# Patient Record
Sex: MALE | Race: WHITE | NOT HISPANIC OR LATINO | Employment: UNEMPLOYED | URBAN - METROPOLITAN AREA
[De-identification: names, ages, dates, MRNs, and addresses within clinical notes are randomized per-mention and may not be internally consistent; named-entity substitution may affect disease eponyms.]

---

## 2022-08-31 ENCOUNTER — HOSPITAL ENCOUNTER (EMERGENCY)
Facility: HOSPITAL | Age: 22
Discharge: HOME | End: 2022-08-31
Attending: EMERGENCY MEDICINE
Payer: COMMERCIAL

## 2022-08-31 VITALS
OXYGEN SATURATION: 97 % | BODY MASS INDEX: 24.33 KG/M2 | DIASTOLIC BLOOD PRESSURE: 71 MMHG | WEIGHT: 155 LBS | HEART RATE: 86 BPM | TEMPERATURE: 100 F | RESPIRATION RATE: 18 BRPM | HEIGHT: 67 IN | SYSTOLIC BLOOD PRESSURE: 134 MMHG

## 2022-08-31 DIAGNOSIS — S62.306D CLOSED DISPLACED FRACTURE OF FIFTH METACARPAL BONE OF RIGHT HAND WITH ROUTINE HEALING, UNSPECIFIED PORTION OF METACARPAL, SUBSEQUENT ENCOUNTER: Primary | ICD-10-CM

## 2022-08-31 PROCEDURE — 99284 PR EMERGENCY DEPT VISIT,LEVEL IV: ICD-10-PCS | Mod: ,,, | Performed by: EMERGENCY MEDICINE

## 2022-08-31 PROCEDURE — 99281 EMR DPT VST MAYX REQ PHY/QHP: CPT

## 2022-08-31 PROCEDURE — 99284 EMERGENCY DEPT VISIT MOD MDM: CPT | Mod: ,,, | Performed by: EMERGENCY MEDICINE

## 2022-08-31 NOTE — FIRST PROVIDER EVALUATION
Emergency Department TeleTriage Encounter Note      CHIEF COMPLAINT    Chief Complaint   Patient presents with    Arm Injury     Broken r hand in [place] on sat, here to see ortho       VITAL SIGNS   Initial Vitals [08/31/22 1742]   BP Pulse Resp Temp SpO2   134/71 86 18 99.7 °F (37.6 °C) 97 %      MAP       --            ALLERGIES    Review of patient's allergies indicates:  Not on File    PROVIDER TRIAGE NOTE  This is a teletriage evaluation of a 22 y.o. male presenting to the ED complaining of boxer fracture diagnosed on Saturday in [place] - came out our ED for a cast.  Explained to patient that this is typically outpatient care - but will defer to onsite provider if ortho can see patient in ED.    Initial orders will be placed and care will be transferred to an alternate provider when patient is roomed for a full evaluation. Any additional orders and the final disposition will be determined by that provider.         ORDERS  Labs Reviewed - No data to display    ED Orders (720h ago, onward)      None              Virtual Visit Note: The provider triage portion of this emergency department evaluation and documentation was performed via [employer], a HIPAA-compliant telemedicine application, in concert with a tele-presenter in the room. A face to face patient evaluation with one of my colleagues will occur once the patient is placed in an emergency department room.      DISCLAIMER: This note was prepared with [employer] voice recognition transcription software. Garbled syntax, mangled pronouns, and other bizarre constructions may be attributed to that software system.

## 2022-08-31 NOTE — ED TRIAGE NOTES
22 y.o. male arrived to the ED via personal transport from home for c/o of arm injury. Pt dx'd w/ boxer's fracture of right hand on Friday, 8/26/22, and splinted by physician at home in [place]. Pt reports traveling down to [facility] for work on Saturday and instructed to come to ER for ortho consult. Denies numbness, tingling, SOB, or chest pain.

## 2022-09-01 NOTE — DISCHARGE INSTRUCTIONS
Please follow-up with Orthopedics for casting of your hand fracture.  Wear the provided splint do not get it wet until you are seen by them.  Take Tylenol or ibuprofen as indicated to help relieve pain.

## 2022-09-01 NOTE — ED PROVIDER NOTES
Encounter Date: 8/31/2022       History     Chief Complaint   Patient presents with    Arm Injury     Broken r hand in [place] on sat, here to see ortho     22M with [facility] remote hand fx, presenting for f/u casting of 5th R MC fx sustained 5 days ago. Pt reports he was striking a punching bag when he injured his hand, initially seen at  in [place] where XR showing displaced 5th MC fx and hand was splinted. Pt presents today because he says he was referred to [facility] for casting. Denies damage to splint, increasing pain, numbness or weakness.     Review of patient's allergies indicates:  No Known Allergies  No past medical history on file.  No past surgical history on file.  No family history on file.     Review of Systems   Musculoskeletal:  Positive for arthralgias (R hand pain).   Skin:  Negative for color change and pallor.   Allergic/Immunologic: Negative for immunocompromised state.   Neurological:  Negative for weakness and numbness.   Hematological:  Does not bruise/bleed easily.     Physical Exam     Initial Vitals [08/31/22 1742]   BP Pulse Resp Temp SpO2   134/71 86 18 99.7 °F (37.6 °C) 97 %      MAP       --         Physical Exam    Nursing note and vitals reviewed.  Constitutional: He appears well-developed and well-nourished. He is not diaphoretic. No distress.   HENT:   Head: Normocephalic and atraumatic.   Nose: Nose normal.   Eyes: Conjunctivae and EOM are normal. Pupils are equal, round, and reactive to light.   Neck:   Normal range of motion.  Cardiovascular:  Normal rate and regular rhythm.           Musculoskeletal:         General: Normal range of motion.      Cervical back: Normal range of motion.      Comments: R hand splinted, CSM intact distally      Neurological: He is alert and oriented to person, place, and time. He has normal strength.   Skin: Skin is warm and dry. Capillary refill takes less than 2 seconds. No pallor.   Psychiatric: He has a normal mood and affect. His behavior is  "normal. Judgment and thought content normal.       ED Course   Procedures  Labs Reviewed - No data to display       Imaging Results    None          Medications - No data to display  Medical Decision Making:   History:   Old Medical Records: I decided to obtain old medical records.  Old Records Summarized: records from clinic visits, records from previous admission(s) and records from another hospital.       <> Summary of Records: From  8/27: "Right hand x-ray: Mildly angulated 5th metacarpal head fracture/boxer's fracture  Patient placed in a ulnar gutter splint and referral sent to Orthopedics. Patient well appearing. Verbal instructions given"    Initial Assessment:   R hand splinted in appropriate position, pt denies worsening pain, CSM intact. No indication for rpt imaging.     Differential Diagnosis:   Hand fracture  ED Management:  D/w Ortho who state that they accept pt's insurance, arranged for outpt f/u. Stable for d/c, I discussed outpatient follow up and return precautions with pt and answered all questions.    Other:   I have discussed this case with another health care provider.                  Clinical Impression:   Final diagnoses:  [S62.306R] Closed displaced fracture of fifth metacarpal bone of right hand with routine healing, unspecified portion of metacarpal, subsequent encounter (Primary)        ED Disposition Condition    Discharge Stable          ED Prescriptions    None       Follow-up Information       Follow up With Specialties Details Why Contact Info Additional Information    [facility] - Emergency Dept Emergency Medicine Go to  If symptoms worsen [address]  [facility] [address] [phone]     [facility] - Orthopedics [facility] Schedule an appointment as soon as possible for a visit   [address], [address]  [facility] [address] [phone] [facility] - Main Building, 5th Floor Please park in [facility] and take " Atrium elevator             Deyanira Pinon MD  09/18/22 5061

## 2022-09-07 ENCOUNTER — TELEPHONE (OUTPATIENT)
Dept: ORTHOPEDICS | Facility: CLINIC | Age: 22
End: 2022-09-07
Payer: COMMERCIAL

## 2022-09-07 DIAGNOSIS — M79.641 RIGHT HAND PAIN: Primary | ICD-10-CM

## 2022-09-07 NOTE — TELEPHONE ENCOUNTER
----- Message from Filomena Schwab MA sent at 9/6/2022  3:10 PM CDT -----    ----- Message -----  From: Rebecca Rosado  Sent: 9/6/2022   2:33 PM CDT  To: [facility] Clinical Support    Type :  Needs Medical Advice    Who Called: pt  Symptoms (please be specific): Closed displaced fracture  How long has patient had these symptoms:  Closed displaced fracture  Pharmacy name and phone #:  no  Would the patient rather a call back or a response via [facility]?  Call   Best Call Back Number:  [phone]

## 2022-09-07 NOTE — TELEPHONE ENCOUNTER
Spoke with pt.   Pt was seen in the ED on 8/31 and was told that he needed to follow up with Orthopedics to have a cast placed.  Pt is calling for an appointment as he was never called to be scheduled.   Apologized to pt.   Scheduled pt to be seen in clinic tomorrow for ED follow up.  Pt verbalized understanding.  Pt given directions to [place]    Pt reports he has been in a splint since he was x-rayed and splinted in [place] prior to arriving in [facility]

## 2022-09-08 ENCOUNTER — HOSPITAL ENCOUNTER (OUTPATIENT)
Dept: RADIOLOGY | Facility: HOSPITAL | Age: 22
Discharge: HOME | End: 2022-09-08
Attending: PHYSICIAN ASSISTANT
Payer: COMMERCIAL

## 2022-09-08 ENCOUNTER — OFFICE VISIT (OUTPATIENT)
Dept: ORTHOPEDICS | Facility: CLINIC | Age: 22
End: 2022-09-08
Payer: COMMERCIAL

## 2022-09-08 DIAGNOSIS — M79.641 RIGHT HAND PAIN: ICD-10-CM

## 2022-09-08 DIAGNOSIS — S62.339A CLOSED BOXER'S FRACTURE, INITIAL ENCOUNTER: Primary | ICD-10-CM

## 2022-09-08 PROCEDURE — 99203 OFFICE O/P NEW LOW 30 MIN: CPT | Mod: S$GLB,,, | Performed by: PHYSICIAN ASSISTANT

## 2022-09-08 PROCEDURE — 99999 PR PBB SHADOW E&M-EST. PATIENT-LVL II: CPT | Mod: PBBFAC,,, | Performed by: PHYSICIAN ASSISTANT

## 2022-09-08 PROCEDURE — 73130 X-RAY EXAM OF HAND: CPT | Mod: 26,RT,, | Performed by: RADIOLOGY

## 2022-09-08 PROCEDURE — 99203 PR OFFICE/OUTPT VISIT, NEW, LEVL III, 30-44 MIN: ICD-10-PCS | Mod: S$GLB,,, | Performed by: PHYSICIAN ASSISTANT

## 2022-09-08 PROCEDURE — 99999 PR PBB SHADOW E&M-EST. PATIENT-LVL II: ICD-10-PCS | Mod: PBBFAC,,, | Performed by: PHYSICIAN ASSISTANT

## 2022-09-08 PROCEDURE — 73130 X-RAY EXAM OF HAND: CPT | Mod: TC,PN,RT

## 2022-09-08 PROCEDURE — 73130 XR HAND COMPLETE 3 VIEW RIGHT: ICD-10-PCS | Mod: 26,RT,, | Performed by: RADIOLOGY

## 2022-09-08 RX ORDER — MELOXICAM 15 MG/1
15 TABLET ORAL DAILY
Qty: 30 TABLET | Refills: 0 | Status: SHIPPED | OUTPATIENT
Start: 2022-09-08 | End: 2023-11-13

## 2022-09-08 NOTE — PROGRESS NOTES
[facility]  History & Physical  Orthopedics    SUBJECTIVE:      Chief Complaint: Right hand injury    Referring Provider: Les Ferrer Dr. is the supervising physician for this encounter/patient    History of Present Illness:  Patient is a 22 y.o. right hand dominant male who presents today with complaints of right hand boxers fracture. Injury occurred on 8/26/22 while doing a boxing workout at the gym back [facility]. This happened just prior to his leaving [place] to visit his father in [facility]. He was treated at the time with a splint and encouraged to see ortho here for cast. He went to the [facility] ED last week for a cast, but no treatment was performed. He reports 3/10 pain, mostly in the ring finger. Stiffness of the fingers due to immobilization. He takes Ibuprofen rarely.    Onset of symptoms/DOI was 8/26/22.    Symptoms are aggravated by activity and movement.    Symptoms are alleviated by rest and immobilization.    Symptoms consist of pain and decreased ROM.    The patient rates their pain as a 3/10.    Attempted treatment(s) and/or interventions include activity modifications, rest, anti-inflammatory medications and immobilization.     The patient denies any fevers, chills, N/V, D/C and presents for evaluation.       No past medical history on file.  No past surgical history on file.  Review of patient's allergies indicates:  No Known Allergies  Social History     Social History Narrative    Not on file     No family history on file.      Current Outpatient Medications:     meloxicam (MOBIC) 15 MG tablet, Take 1 tablet (15 mg total) by mouth once daily., Disp: 30 tablet, Rfl: 0      Review of Systems:  Constitutional: no fever or chills  Eyes: no visual changes  ENT: no nasal congestion or sore throat  Respiratory: no cough or shortness of breath  Cardiovascular: no chest pain  Gastrointestinal: no nausea or vomiting, tolerating diet  Musculoskeletal: pain, soreness,  and decreased ROM    OBJECTIVE:      Vital Signs (Most Recent):  There were no vitals filed for this visit.  There is no height or weight on file to calculate BMI.      Physical Exam:  Constitutional: The patient appears well-developed and well-nourished. No distress.   Skin: No lesions appreciated  Head: Normocephalic and atraumatic.   Nose: Nose normal.   Ears: No deformities seen  Eyes: Conjunctivae and EOM are normal.   Neck: No tracheal deviation present.   Cardiovascular: Normal rate and intact distal pulses.    Pulmonary/Chest: Effort normal. No respiratory distress.   Abdominal: There is no guarding.   Neurological: The patient is alert.   Psychiatric: The patient has a normal mood and affect.     Right Hand/Wrist Examination:    Observation/Inspection:  Swelling  none    Deformity  none  Discoloration  Healing ecchymosis present    Scars   none    Atrophy  none    HAND/WRIST EXAMINATION:  Finkelstein's Test   Neg  WHAT Test    Neg  Snuff box tenderness   Neg  Saul's Test    Neg  Hook of Hamate Tenderness  Neg  CMC grind    Neg  Circumduction test   Neg  Very mild TTP to the 5th metacarpal head. TTP to the ring PIP volar surface    Neurovascular Exam:  Digits WWP, brisk CR < 3s throughout  NVI motor/LTS to M/R/U nerves, radial pulse 2+  Tinel's Test - Carpal Tunnel  Neg  Tinel's Test - Cubital Tunnel  Neg  Phalen's Test    Neg  Median Nerve Compression Test Neg    ROM hand: decreased due to pain/stiffness, no rotational deformity noted    ROM wrist full, painless    ROM elbow full, painless    Abdomen not guarded  Respirations nonlabored  Perfusion intact    Diagnostic Results:     Imaging - I independently viewed the patient's imaging as well as the radiology report.      FINDINGS:  There is irregularity of the distal 5th metacarpal suggesting a fracture with mild angulation.  Remainder of the bony structures are intact.     Impression:     Fracture of the distal 5th metacarpal    EMG -  none    ASSESSMENT/PLAN:      22 y.o. yo male with Right 5th metacarpal fracture, angulation within tolerance. Right 4th middle phalanx volar avulsion fracture noted as well. 2 weeks from injury    Plan: The patient and I had a thorough discussion today.  We discussed the working diagnosis as well as several other potential alternative diagnoses.  Xrays reviewed with patient. He is given a TKO brace to wear, may remove for gentle motion, however we discuss NWB. Mobic 15mg ordered today. I will see him back in 3 weeks for repeat hand xrays.    Should the patient's symptoms worsen, persist, or fail to improve they should return for reevaluation and I would be happy to see them back anytime.           Please do not hesitate to reach out to us via email, phone, or MyChart with any questions, concerns, or feedback.

## 2022-09-22 ENCOUNTER — TELEPHONE (OUTPATIENT)
Dept: ORTHOPEDICS | Facility: CLINIC | Age: 22
End: 2022-09-22
Payer: COMMERCIAL

## 2022-09-22 DIAGNOSIS — M79.641 RIGHT HAND PAIN: Primary | ICD-10-CM

## 2022-09-30 ENCOUNTER — HOSPITAL ENCOUNTER (OUTPATIENT)
Dept: RADIOLOGY | Facility: HOSPITAL | Age: 22
Discharge: HOME | End: 2022-09-30
Attending: PHYSICIAN ASSISTANT
Payer: COMMERCIAL

## 2022-09-30 ENCOUNTER — OFFICE VISIT (OUTPATIENT)
Dept: ORTHOPEDICS | Facility: CLINIC | Age: 22
End: 2022-09-30
Payer: COMMERCIAL

## 2022-09-30 DIAGNOSIS — M79.641 RIGHT HAND PAIN: ICD-10-CM

## 2022-09-30 DIAGNOSIS — S62.339A CLOSED BOXER'S FRACTURE, INITIAL ENCOUNTER: Primary | ICD-10-CM

## 2022-09-30 PROCEDURE — 73130 X-RAY EXAM OF HAND: CPT | Mod: 26,RT,, | Performed by: RADIOLOGY

## 2022-09-30 PROCEDURE — 73130 XR HAND COMPLETE 3 VIEW RIGHT: ICD-10-PCS | Mod: 26,RT,, | Performed by: RADIOLOGY

## 2022-09-30 PROCEDURE — 99999 PR PBB SHADOW E&M-EST. PATIENT-LVL II: ICD-10-PCS | Mod: PBBFAC,,, | Performed by: PHYSICIAN ASSISTANT

## 2022-09-30 PROCEDURE — 73130 X-RAY EXAM OF HAND: CPT | Mod: TC,PO,RT

## 2022-09-30 PROCEDURE — 99213 PR OFFICE/OUTPT VISIT, EST, LEVL III, 20-29 MIN: ICD-10-PCS | Mod: S$GLB,,, | Performed by: PHYSICIAN ASSISTANT

## 2022-09-30 PROCEDURE — 99213 OFFICE O/P EST LOW 20 MIN: CPT | Mod: S$GLB,,, | Performed by: PHYSICIAN ASSISTANT

## 2022-09-30 PROCEDURE — 99999 PR PBB SHADOW E&M-EST. PATIENT-LVL II: CPT | Mod: PBBFAC,,, | Performed by: PHYSICIAN ASSISTANT

## 2022-09-30 NOTE — PROGRESS NOTES
[facility]  History & Physical  Orthopedics    SUBJECTIVE:      Chief Complaint: Right hand injury    Referring Provider: No ref. provider found     Dr. Gutierrez is the supervising physician for this encounter/patient    History of Present Illness:  Patient is a 22 y.o. right hand dominant male who presents today with complaints of right hand boxers fracture. Injury occurred on 8/26/22 while doing a boxing workout at the gym back [facility]. This happened just prior to his leaving [place] to visit his father in [facility]. He was treated at the time with a splint and encouraged to see ortho here for cast. He went to the [facility] ED last week for a cast, but no treatment was performed. He reports 3/10 pain, mostly in the ring finger. Stiffness of the fingers due to immobilization. He takes Ibuprofen rarely.    Interval History 9/30/22: the patient returns for continued treatment of his right hand boxers fracture, now 5 weeks from injury. He admits to only wearing the TKO brace at night, and has been using the hand. He says there is some stiffness with end range flexion, and is not able to lift heavy weight yet. He is not taking any medications for this.    Onset of symptoms/DOI was 8/26/22.    Symptoms are aggravated by activity and movement.    Symptoms are alleviated by rest and immobilization.    Symptoms consist of pain and decreased ROM.    The patient rates their pain as a 2/10    Attempted treatment(s) and/or interventions include activity modifications, rest, anti-inflammatory medications and immobilization.     The patient denies any fevers, chills, N/V, D/C and presents for evaluation.       No past medical history on file.  No past surgical history on file.  Review of patient's allergies indicates:  No Known Allergies  Social History     Social History Narrative    Not on file     No family history on file.      Current Outpatient Medications:     meloxicam (MOBIC) 15 MG tablet, Take 1  tablet (15 mg total) by mouth once daily., Disp: 30 tablet, Rfl: 0      Review of Systems:  Constitutional: no fever or chills  Eyes: no visual changes  ENT: no nasal congestion or sore throat  Respiratory: no cough or shortness of breath  Cardiovascular: no chest pain  Gastrointestinal: no nausea or vomiting, tolerating diet  Musculoskeletal: pain, soreness, and decreased ROM    OBJECTIVE:      Vital Signs (Most Recent):  There were no vitals filed for this visit.  There is no height or weight on file to calculate BMI.      Physical Exam:  Constitutional: The patient appears well-developed and well-nourished. No distress.   Skin: No lesions appreciated  Head: Normocephalic and atraumatic.   Nose: Nose normal.   Ears: No deformities seen  Eyes: Conjunctivae and EOM are normal.   Neck: No tracheal deviation present.   Cardiovascular: Normal rate and intact distal pulses.    Pulmonary/Chest: Effort normal. No respiratory distress.   Abdominal: There is no guarding.   Neurological: The patient is alert.   Psychiatric: The patient has a normal mood and affect.     Right Hand/Wrist Examination:    Observation/Inspection:  Swelling  none    Deformity  none  Discoloration  none   Scars   none    Atrophy  none    HAND/WRIST EXAMINATION:  Finkelstein's Test   Neg  WHAT Test    Neg  Snuff box tenderness   Neg  Saul's Test    Neg  Hook of Hamate Tenderness  Neg  CMC grind    Neg  Circumduction test   Neg  Very mild TTP to the 5th metacarpal head.     Neurovascular Exam:  Digits WWP, brisk CR < 3s throughout  NVI motor/LTS to M/R/U nerves, radial pulse 2+  Tinel's Test - Carpal Tunnel  Neg  Tinel's Test - Cubital Tunnel  Neg  Phalen's Test    Neg  Median Nerve Compression Test Neg    ROM hand: full motion present    ROM wrist full, painless    ROM elbow full, painless    Abdomen not guarded  Respirations nonlabored  Perfusion intact    Diagnostic Results:     Imaging - I independently viewed the patient's imaging as well as  the radiology report.      FINDINGS:  Healing fracture of the 5th metacarpal neck identified in unchanged position as compared to the previous study     Impression:     See above see above    EMG - none    ASSESSMENT/PLAN:      22 y.o. yo male with Right 5th metacarpal fracture, angulation within tolerance. Right 4th middle phalanx volar avulsion fracture noted as well. 5 weeks from injury    Plan: The patient and I had a thorough discussion today.  Xrays reviewed with patient. He has not been following recommended treatment plan. I do ask him to hold on weight bearing for another 1-2 weeks, then progress as tolerated. He will message me for restriction release letter when he is ready. RTC on prn basis.    Should the patient's symptoms worsen, persist, or fail to improve they should return for reevaluation and I would be happy to see them back anytime.           Please do not hesitate to reach out to us via email, phone, or MyChart with any questions, concerns, or feedback.

## 2023-11-09 ENCOUNTER — OFFICE VISIT (OUTPATIENT)
Dept: URGENT CARE | Facility: CLINIC | Age: 23
End: 2023-11-09
Payer: COMMERCIAL

## 2023-11-09 VITALS
WEIGHT: 155 LBS | DIASTOLIC BLOOD PRESSURE: 82 MMHG | HEART RATE: 72 BPM | OXYGEN SATURATION: 97 % | BODY MASS INDEX: 24.33 KG/M2 | RESPIRATION RATE: 17 BRPM | SYSTOLIC BLOOD PRESSURE: 119 MMHG | HEIGHT: 67 IN | TEMPERATURE: 99 F

## 2023-11-09 DIAGNOSIS — Z76.0 ENCOUNTER FOR MEDICATION REFILL: Primary | ICD-10-CM

## 2023-11-09 DIAGNOSIS — E10.9 TYPE 1 DIABETES MELLITUS WITHOUT COMPLICATION: ICD-10-CM

## 2023-11-09 PROCEDURE — 99203 OFFICE O/P NEW LOW 30 MIN: CPT | Mod: S$GLB,,,

## 2023-11-09 PROCEDURE — 99203 PR OFFICE/OUTPT VISIT, NEW, LEVL III, 30-44 MIN: ICD-10-PCS | Mod: S$GLB,,,

## 2023-11-09 RX ORDER — INSULIN LISPRO 100 [IU]/ML
INJECTION, SOLUTION INTRAVENOUS; SUBCUTANEOUS
Qty: 10 ML | Refills: 0 | Status: SHIPPED | OUTPATIENT
Start: 2023-11-09 | End: 2023-11-13 | Stop reason: SDUPTHER

## 2023-11-09 RX ORDER — INSULIN LISPRO 100 [IU]/ML
INJECTION, SOLUTION INTRAVENOUS; SUBCUTANEOUS
COMMUNITY
Start: 2022-08-19 | End: 2023-12-12 | Stop reason: SDUPTHER

## 2023-11-09 NOTE — PROGRESS NOTES
"Subjective:      Patient ID: Shadi Velazquez is a 23 y.o. male.    Vitals:  height is 5' 7" (1.702 m) and weight is 70.3 kg (155 lb). His oral temperature is 98.5 °F (36.9 °C). His blood pressure is 119/82 and his pulse is 72. His respiration is 17 and oxygen saturation is 97%.     Chief Complaint: Medication Refill    This is a 23 y.o. male who presents today wishing to get refills on insulin, he states he had diabetes type one. He currently doesn't have a PCP and is out of insulin.     Provider note:    22 yo M presents today for a refill of his insulin. He is type 1 diabetic insulin dependent and [place]. He has an appt with PCP on Monday and states he has enough insulin to last until then but just wanted to be safe. He has seen an endocrinologist in his previous state [facility] and states his DM is well managed.     Medication Refill  This is a recurrent problem. The problem occurs daily.       Constitution: Negative.   HENT: Negative.     Neck: neck negative.   Cardiovascular: Negative.    Eyes: Negative.    Respiratory: Negative.     Gastrointestinal: Negative.    Endocrine: negative.   Genitourinary: Negative.    Musculoskeletal: Negative.    Skin: Negative.    Allergic/Immunologic: Negative.    Neurological: Negative.    Hematologic/Lymphatic: Negative.    Psychiatric/Behavioral: Negative.        Objective:     Physical Exam   Constitutional: He is oriented to person, place, and time. normal  Eyes: Conjunctivae are normal. Pupils are equal, round, and reactive to light. Extraocular movement intact   Abdominal: Normal appearance.   Musculoskeletal: Normal range of motion.         General: Normal range of motion.   Neurological: He is alert and oriented to person, place, and time.   Skin: Skin is warm and dry.       Assessment:     1. Encounter for medication refill    2. Type 1 diabetes mellitus without complication        Plan:   Medication refilled. Pt will see PCP " Monday and establish care with endocrinologist as well     Encounter for medication refill    Type 1 diabetes mellitus without complication  -     insulin lispro (HUMALOG U-100 INSULIN) 100 unit/mL injection; Use as instructed.Estimated total daily dose 50 units. 24 Hour Total Basal Rate 26.40 units Basal: MN 1.100 units/hr 0600 1.100 units/hr 1800 1.100 units/hr Bolus Insulin:Carb ratio MN 1: 9 gm/CHO Sensitivity: MN 1: 50 mg/dl BG Target range:  mg/dl 0600 120 mg/dl 1800 150 mg/dl  Dispense: 10 mL; Refill: 0

## 2023-11-13 ENCOUNTER — OFFICE VISIT (OUTPATIENT)
Dept: PRIMARY CARE CLINIC | Facility: CLINIC | Age: 23
End: 2023-11-13
Payer: COMMERCIAL

## 2023-11-13 VITALS
HEIGHT: 67 IN | OXYGEN SATURATION: 98 % | DIASTOLIC BLOOD PRESSURE: 78 MMHG | HEART RATE: 79 BPM | WEIGHT: 153 LBS | SYSTOLIC BLOOD PRESSURE: 118 MMHG | BODY MASS INDEX: 24.01 KG/M2

## 2023-11-13 DIAGNOSIS — Z00.00 ANNUAL PHYSICAL EXAM: Primary | ICD-10-CM

## 2023-11-13 DIAGNOSIS — E10.9 TYPE 1 DIABETES MELLITUS WITHOUT COMPLICATION: ICD-10-CM

## 2023-11-13 PROBLEM — Z96.41 PRESENCE OF INSULIN PUMP: Status: ACTIVE | Noted: 2018-09-24

## 2023-11-13 PROCEDURE — 99385 PR PREVENTIVE VISIT,NEW,18-39: ICD-10-PCS | Mod: S$GLB,,, | Performed by: STUDENT IN AN ORGANIZED HEALTH CARE EDUCATION/TRAINING PROGRAM

## 2023-11-13 PROCEDURE — 3074F PR MOST RECENT SYSTOLIC BLOOD PRESSURE < 130 MM HG: ICD-10-PCS | Mod: CPTII,S$GLB,, | Performed by: STUDENT IN AN ORGANIZED HEALTH CARE EDUCATION/TRAINING PROGRAM

## 2023-11-13 PROCEDURE — 3074F SYST BP LT 130 MM HG: CPT | Mod: CPTII,S$GLB,, | Performed by: STUDENT IN AN ORGANIZED HEALTH CARE EDUCATION/TRAINING PROGRAM

## 2023-11-13 PROCEDURE — 99385 PREV VISIT NEW AGE 18-39: CPT | Mod: S$GLB,,, | Performed by: STUDENT IN AN ORGANIZED HEALTH CARE EDUCATION/TRAINING PROGRAM

## 2023-11-13 PROCEDURE — 1160F PR REVIEW ALL MEDS BY PRESCRIBER/CLIN PHARMACIST DOCUMENTED: ICD-10-PCS | Mod: CPTII,S$GLB,, | Performed by: STUDENT IN AN ORGANIZED HEALTH CARE EDUCATION/TRAINING PROGRAM

## 2023-11-13 PROCEDURE — 1159F MED LIST DOCD IN RCRD: CPT | Mod: CPTII,S$GLB,, | Performed by: STUDENT IN AN ORGANIZED HEALTH CARE EDUCATION/TRAINING PROGRAM

## 2023-11-13 PROCEDURE — 3078F DIAST BP <80 MM HG: CPT | Mod: CPTII,S$GLB,, | Performed by: STUDENT IN AN ORGANIZED HEALTH CARE EDUCATION/TRAINING PROGRAM

## 2023-11-13 PROCEDURE — 3008F BODY MASS INDEX DOCD: CPT | Mod: CPTII,S$GLB,, | Performed by: STUDENT IN AN ORGANIZED HEALTH CARE EDUCATION/TRAINING PROGRAM

## 2023-11-13 PROCEDURE — 99999 PR PBB SHADOW E&M-EST. PATIENT-LVL IV: CPT | Mod: PBBFAC,,, | Performed by: STUDENT IN AN ORGANIZED HEALTH CARE EDUCATION/TRAINING PROGRAM

## 2023-11-13 PROCEDURE — 1159F PR MEDICATION LIST DOCUMENTED IN MEDICAL RECORD: ICD-10-PCS | Mod: CPTII,S$GLB,, | Performed by: STUDENT IN AN ORGANIZED HEALTH CARE EDUCATION/TRAINING PROGRAM

## 2023-11-13 PROCEDURE — 3078F PR MOST RECENT DIASTOLIC BLOOD PRESSURE < 80 MM HG: ICD-10-PCS | Mod: CPTII,S$GLB,, | Performed by: STUDENT IN AN ORGANIZED HEALTH CARE EDUCATION/TRAINING PROGRAM

## 2023-11-13 PROCEDURE — 99999 PR PBB SHADOW E&M-EST. PATIENT-LVL IV: ICD-10-PCS | Mod: PBBFAC,,, | Performed by: STUDENT IN AN ORGANIZED HEALTH CARE EDUCATION/TRAINING PROGRAM

## 2023-11-13 PROCEDURE — 1160F RVW MEDS BY RX/DR IN RCRD: CPT | Mod: CPTII,S$GLB,, | Performed by: STUDENT IN AN ORGANIZED HEALTH CARE EDUCATION/TRAINING PROGRAM

## 2023-11-13 PROCEDURE — 3008F PR BODY MASS INDEX (BMI) DOCUMENTED: ICD-10-PCS | Mod: CPTII,S$GLB,, | Performed by: STUDENT IN AN ORGANIZED HEALTH CARE EDUCATION/TRAINING PROGRAM

## 2023-11-13 RX ORDER — INSULIN LISPRO 100 [IU]/ML
INJECTION, SOLUTION INTRAVENOUS; SUBCUTANEOUS
Qty: 10 ML | Refills: 7 | Status: SHIPPED | OUTPATIENT
Start: 2023-11-13 | End: 2024-01-02 | Stop reason: SDUPTHER

## 2023-11-13 RX ORDER — INFUSION SET FOR INSULIN PUMP
1 INFUSION SETS-PARAPHERNALIA MISCELLANEOUS
Qty: 10 EACH | Refills: 0 | Status: SHIPPED | OUTPATIENT
Start: 2023-11-13 | End: 2023-11-13 | Stop reason: SDUPTHER

## 2023-11-13 RX ORDER — INFUSION SET FOR INSULIN PUMP
1 INFUSION SETS-PARAPHERNALIA MISCELLANEOUS
Qty: 10 EACH | Refills: 0 | OUTPATIENT
Start: 2023-11-13 | End: 2024-01-02 | Stop reason: SDUPTHER

## 2023-11-13 RX ORDER — BLOOD-GLUCOSE SENSOR
3 EACH MISCELLANEOUS
Qty: 3 EACH | Refills: 5 | Status: SHIPPED | OUTPATIENT
Start: 2023-11-13 | End: 2024-01-02 | Stop reason: SDUPTHER

## 2023-11-13 NOTE — PROGRESS NOTES
"Office visit  Patient: Shadi Velazquez   11/13/2023     Assessment:     1. Annual physical exam    2. Type 1 diabetes mellitus without complication      Plan:         1. Annual physical exam  -     TSH; Future; Expected date: 11/13/2023  -     Lipid Panel; Future; Expected date: 11/13/2023  -     Comprehensive Metabolic Panel; Future; Expected date: 11/13/2023  -     CBC Auto Differential; Future; Expected date: 11/13/2023  -     HEPATITIS C ANTIBODY; Future; Expected date: 11/13/2023  -     HIV 1/2 Ag/Ab (4th Gen); Future; Expected date: 11/13/2023        -Discussed healthy habits and recommended preventative care    2. Type 1 diabetes mellitus without complication  -     Ambulatory referral/consult to Endocrinology; Future; Expected date: 11/20/2023  -     insulin lispro (HUMALOG U-100 INSULIN) 100 unit/mL injection; Use as instructed.Estimated total daily dose 50 units. 24 Hour Total Basal Rate 26.40 units Basal: MN 1.100 units/hr 0600 1.100 units/hr 1800 1.100 units/hr Bolus Insulin:Carb ratio MN 1: 9 gm/CHO Sensitivity: MN 1: 50 mg/dl BG Target range:  mg/dl 0600 120 mg/dl 1800 150 mg/dl  Dispense: 10 mL; Refill: 7  -     blood-glucose sensor (DEXCOM G7 SENSOR) Brea; 3 each by Misc.(Non-Drug; Combo Route) route 5 (five) times daily.  Dispense: 3 each; Refill: 5  -     infusion set for insulin pump (AUTOSOFT XC INFUSION SET 23") ISet; 1 each by Misc.(Non-Drug; Combo Route) route 5 (five) times daily.  Dispense: 10 each; Refill: 0  -     insulin pump syringe 3 mL Misc; 1 each by Misc.(Non-Drug; Combo Route) route 5 (five) times daily.  Dispense: 10 each; Refill: 0       -Sent prescription for insulin pump supplies and Dexcom; patient to contact if prescriptions don't go through to the pharmacy      CHIEF COMPLAINT: check up    HPI: Shadi Velazquez is a 23 y.o. male who presents for an annual physical. He has no complaints.  He primarily needs a referral to endocrinology for management of his insulin pump.  His " "diabetes is stable.    Review of Systems   Constitutional:  Negative for fever and malaise/fatigue.   HENT:  Negative for congestion, ear discharge, ear pain and sore throat.    Eyes:  Negative for pain and discharge.   Respiratory:  Negative for cough and shortness of breath.    Cardiovascular:  Negative for chest pain and palpitations.   Gastrointestinal:  Negative for abdominal pain, constipation, diarrhea, nausea and vomiting.   Genitourinary:  Negative for dysuria, frequency and hematuria.   Musculoskeletal:  Negative for joint pain and myalgias.   Skin:  Negative for rash.   Neurological:  Negative for dizziness, seizures and headaches.         Current Outpatient Medications   Medication Instructions    blood-glucose sensor (DEXCOM G7 SENSOR) Brea 3 each, Misc.(Non-Drug; Combo Route), 5 times daily    infusion set for insulin pump (AUTOSOFT XC INFUSION SET 23") ISet 1 each, Misc.(Non-Drug; Combo Route), 5 times daily    insulin lispro (HUMALOG U-100 INSULIN) 100 unit/mL injection Use as instructed.Estimated total daily dose 50 units. 24 Hour Total Basal Rate 26.40 units Basal: MN 1.100 units/hr 0600 1.100 units/hr 1800 1.100 units/hr Bolus Insulin:Carb ratio MN 1: 9 gm/CHO Sensitivity: MN 1: 50 mg/dl BG Target range:  mg/dl 0600 120 mg/dl 1800 150 mg/dl    insulin lispro (HUMALOG U-100 INSULIN) 100 unit/mL injection Use as instructed.Estimated total daily dose 50 units. 24 Hour Total Basal Rate 26.40 units Basal: MN 1.100 units/hr 0600 1.100 units/hr 1800 1.100 units/hr Bolus Insulin:Carb ratio MN 1: 9 gm/CHO Sensitivity: MN 1: 50 mg/dl BG Target range:  mg/dl 0600 120 mg/dl 1800 150 mg/dl    insulin pump syringe 3 mL Misc 1 each, Misc.(Non-Drug; Combo Route), 5 times daily       No results found for: "HGBA1C"  No results found for: "MICALBCREAT"  No results found for: "LDLCALC", "CHOL", "HDL", "TRIG"    No results found for: "NA", "K", "CL", "CO2", "GLU", "BUN", "CREATININE", "CALCIUM", "PROT", " ""ALBUMIN", "BILITOT", "ALKPHOS", "AST", "ALT", "ANIONGAP", "ESTGFRAFRICA", "EGFRNONAA", "WBC", "HGB", "HCT", "MCV", "PLT", "TSH", "PSA", "PSADIAG", "HEPCAB"    No results found for: "LH", "FSH", "TOTALTESTOST", "PROGESTERONE", "ESTRADIOL", "[id number]", "[id number]", "FERRITIN", "IRON", "TRANSFERRIN", "TIBC", "FESATURATED", "ZINC"      Past Medical History:   Diagnosis Date    Type 1 diabetes mellitus      History reviewed. No pertinent surgical history.  Vitals:    11/13/23 1603   BP: 118/78   Pulse: 79   SpO2: 98%   Weight: 69.4 kg (153 lb)   Height: 5' 7" (1.702 m)   PainSc: 0-No pain     Objective:   Physical Exam  Constitutional:       Appearance: Normal appearance. He is well-developed.   HENT:      Head: Normocephalic and atraumatic.      Right Ear: Hearing, tympanic membrane, ear canal and external ear normal. No decreased hearing noted. No drainage or swelling.      Left Ear: Hearing, tympanic membrane, ear canal and external ear normal. No decreased hearing noted. No drainage or swelling.      Nose: Nose normal. No rhinorrhea.      Mouth/Throat:      Pharynx: No oropharyngeal exudate or posterior oropharyngeal erythema.   Eyes:      General: Lids are normal.         Right eye: No discharge.         Left eye: No discharge.      Conjunctiva/sclera: Conjunctivae normal.      Right eye: Right conjunctiva is not injected. No exudate.     Left eye: Left conjunctiva is not injected. No exudate.     Pupils: Pupils are equal, round, and reactive to light.   Neck:      Thyroid: No thyroid mass or thyromegaly.      Vascular: Normal carotid pulses. No carotid bruit, hepatojugular reflux or JVD.      Trachea: Trachea normal.   Cardiovascular:      Rate and Rhythm: Normal rate and regular rhythm.      Heart sounds: Normal heart sounds.   Pulmonary:      Effort: Pulmonary effort is normal. No respiratory distress.   Abdominal:      Tenderness: Negative signs include Schofield's sign.   Musculoskeletal:      Cervical back: " Full passive range of motion without pain. No edema, erythema or rigidity.   Lymphadenopathy:      Cervical: No cervical adenopathy.   Skin:     General: Skin is warm and dry.   Neurological:      Mental Status: He is alert.   Psychiatric:         Speech: Speech normal.         Behavior: Behavior normal.           Heidi Nunez MD  Internal Medicine and Pediatrics

## 2023-11-14 ENCOUNTER — TELEPHONE (OUTPATIENT)
Dept: PRIMARY CARE CLINIC | Facility: CLINIC | Age: 23
End: 2023-11-14
Payer: COMMERCIAL

## 2023-11-20 ENCOUNTER — PATIENT MESSAGE (OUTPATIENT)
Dept: ADMINISTRATIVE | Facility: HOSPITAL | Age: 23
End: 2023-11-20
Payer: COMMERCIAL

## 2023-11-24 ENCOUNTER — PATIENT MESSAGE (OUTPATIENT)
Dept: PRIMARY CARE CLINIC | Facility: CLINIC | Age: 23
End: 2023-11-24
Payer: COMMERCIAL

## 2023-11-24 DIAGNOSIS — E10.9 TYPE 1 DIABETES MELLITUS WITHOUT COMPLICATION: Primary | ICD-10-CM

## 2023-11-24 NOTE — TELEPHONE ENCOUNTER
"I sent the  kit for the Dexcom. I also sent the pump supplies last time. Does Estephanie have any of the pump supplies prescriptions or do they need me to send it again?  It was an infusion set with 23" tubing  "

## 2023-12-12 ENCOUNTER — PATIENT MESSAGE (OUTPATIENT)
Dept: PRIMARY CARE CLINIC | Facility: CLINIC | Age: 23
End: 2023-12-12
Payer: COMMERCIAL

## 2023-12-12 RX ORDER — INSULIN LISPRO 100 [IU]/ML
INJECTION, SOLUTION INTRAVENOUS; SUBCUTANEOUS
Qty: 10 ML | Refills: 5 | Status: SHIPPED | OUTPATIENT
Start: 2023-12-12 | End: 2024-01-03 | Stop reason: SDUPTHER

## 2023-12-12 NOTE — TELEPHONE ENCOUNTER
What information can I give this patient about his medication since his insurance doesn't want to pay for it

## 2023-12-12 NOTE — TELEPHONE ENCOUNTER
I'm not really sure. This seems like an insurance issue. Can he call his insurance company and explain the situation? I'll send another prescription and see if that gets covered.

## 2023-12-28 ENCOUNTER — PATIENT MESSAGE (OUTPATIENT)
Dept: PRIMARY CARE CLINIC | Facility: CLINIC | Age: 23
End: 2023-12-28
Payer: COMMERCIAL

## 2023-12-28 NOTE — TELEPHONE ENCOUNTER
I advise pt to call the patient to call your his pharmacy about his medication. Pt has refills that provider sent on 12/12/23

## 2023-12-29 ENCOUNTER — PATIENT MESSAGE (OUTPATIENT)
Dept: PRIMARY CARE CLINIC | Facility: CLINIC | Age: 23
End: 2023-12-29
Payer: COMMERCIAL

## 2024-01-02 ENCOUNTER — OFFICE VISIT (OUTPATIENT)
Dept: URGENT CARE | Facility: CLINIC | Age: 24
End: 2024-01-02
Payer: COMMERCIAL

## 2024-01-02 VITALS
HEART RATE: 101 BPM | HEIGHT: 67 IN | OXYGEN SATURATION: 97 % | DIASTOLIC BLOOD PRESSURE: 76 MMHG | BODY MASS INDEX: 24.01 KG/M2 | SYSTOLIC BLOOD PRESSURE: 125 MMHG | WEIGHT: 153 LBS | TEMPERATURE: 98 F | RESPIRATION RATE: 18 BRPM

## 2024-01-02 DIAGNOSIS — E10.9 TYPE 1 DIABETES MELLITUS WITHOUT COMPLICATION: Primary | ICD-10-CM

## 2024-01-02 DIAGNOSIS — E10.9 TYPE 1 DIABETES MELLITUS WITHOUT COMPLICATION: ICD-10-CM

## 2024-01-02 PROCEDURE — 99203 OFFICE O/P NEW LOW 30 MIN: CPT | Mod: S$GLB,,,

## 2024-01-02 RX ORDER — BLOOD-GLUCOSE SENSOR
3 EACH MISCELLANEOUS
Qty: 3 EACH | Refills: 0 | Status: SHIPPED | OUTPATIENT
Start: 2024-01-02 | End: 2024-01-31 | Stop reason: SDUPTHER

## 2024-01-02 RX ORDER — INSULIN LISPRO 100 [IU]/ML
INJECTION, SOLUTION INTRAVENOUS; SUBCUTANEOUS
Qty: 10 ML | Refills: 0 | Status: SHIPPED | OUTPATIENT
Start: 2024-01-02

## 2024-01-02 NOTE — TELEPHONE ENCOUNTER
----- Message from Tootie Alvarado sent at 1/2/2024  2:28 PM CST -----  Contact: Shadi   Shadi would like a call back. He is having an issue with getting his insulin  He knows that the [facility] is holding refills for him But he said he needs more insulin & needs a PA from Dr Nunez  It will cost him over $300 without the PA  He said he does not have enough to last him today

## 2024-01-02 NOTE — PROGRESS NOTES
"Subjective:      Patient ID: Shadi Velazquez is a 23 y.o. male.    Vitals:  height is 5' 7" (1.702 m) and weight is 69.4 kg (153 lb). His oral temperature is 98.2 °F (36.8 °C). His blood pressure is 125/76 and his pulse is 101. His respiration is 18 and oxygen saturation is 97%.     Chief Complaint: Medication Refill    This is a 23 y.o. male with PMHx of DMT1, who presents today with a chief complaint of medication refill. Patients states he need a refill on his Humalog. Patient says he has only about 30 units of insulin left. His PCP was able to do a prior authorization today, but told him it can take 4-5 days. Pt has his first endocrinology appointment in March with new provider. He denies any complaints at this time.    Medication Refill  This is a new problem. Pertinent negatives include no abdominal pain, arthralgias, chest pain, chills, congestion, coughing, fatigue, fever, joint swelling, myalgias, nausea, neck pain, numbness, rash, sore throat or vomiting. Nothing aggravates the symptoms. He has tried nothing for the symptoms.       Constitution: Negative for activity change, appetite change, chills, fatigue and fever.   HENT:  Negative for ear pain, congestion, postnasal drip, sinus pain, sinus pressure and sore throat.    Neck: Negative for neck pain and neck swelling.   Cardiovascular:  Negative for chest pain and sob on exertion.   Eyes:  Negative for eye trauma, eye discharge and eye redness.   Respiratory:  Negative for cough, shortness of breath and wheezing.    Gastrointestinal:  Negative for abdominal pain, nausea, vomiting, constipation and diarrhea.   Genitourinary:  Negative for dysuria, frequency, urgency and urine decreased.   Musculoskeletal:  Negative for pain, joint pain, joint swelling, abnormal ROM of joint and muscle ache.   Skin:  Negative for color change, rash, laceration and erythema.   Neurological:  Negative for dizziness, light-headedness, altered mental status and numbness. " "  Psychiatric/Behavioral:  Negative for altered mental status and confusion.       Past Medical History:   Diagnosis Date    Type 1 diabetes mellitus        History reviewed. No pertinent surgical history.    History reviewed. No pertinent family history.    Social History     Socioeconomic History    Marital status:    Tobacco Use    Smoking status: Never    Smokeless tobacco: Never       Current Outpatient Medications   Medication Sig Dispense Refill    blood-glucose meter,continuous (DEXCOM ) Misc 1 each by Misc.(Non-Drug; Combo Route) route 3 (three) times daily. 1 each 0    infusion set for insulin pump (AUTOSOFT XC INFUSION SET 23") ISet 1 each by Misc.(Non-Drug; Combo Route) route 5 (five) times daily. 10 each 0    insulin lispro (HUMALOG U-100 INSULIN) 100 unit/mL injection Use as instructed.Estimated total daily dose 50 units. 24 Hour Total Basal Rate 26.40 units Basal: MN 1.100 units/hr 0600 1.100 units/hr 1800 1.100 units/hr Bolus Insulin:Carb ratio MN 1: 9 gm/CHO Sensitivity: MN 1: 50 mg/dl BG Target range:  mg/dl 0600 120 mg/dl 1800 150 mg/dl Strength: 100 Units/mL 10 mL 5    insulin pump syringe 3 mL Misc 1 each by Misc.(Non-Drug; Combo Route) route 5 (five) times daily. 10 each 0    blood-glucose sensor (DEXCOM G7 SENSOR) Brea 3 each by Misc.(Non-Drug; Combo Route) route 5 (five) times daily. 3 each 0    insulin lispro (HUMALOG U-100 INSULIN) 100 unit/mL injection Use as instructed.Estimated total daily dose 50 units. 24 Hour Total Basal Rate 26.40 units Basal: MN 1.100 units/hr 0600 1.100 units/hr 1800 1.100 units/hr Bolus Insulin:Carb ratio MN 1: 9 gm/CHO Sensitivity: MN 1: 50 mg/dl BG Target range:  mg/dl 0600 120 mg/dl 1800 150 mg/dl 10 mL 0     No current facility-administered medications for this visit.       Review of patient's allergies indicates:  No Known Allergies   Objective:     Physical Exam   Constitutional: He is oriented to person, place, and time. " He appears well-developed. He is cooperative.  Non-toxic appearance. He does not appear ill. No distress.   HENT:   Head: Normocephalic and atraumatic.   Ears:   Right Ear: Hearing, tympanic membrane, external ear and ear canal normal.   Left Ear: Hearing, tympanic membrane, external ear and ear canal normal.   Nose: Nose normal. No mucosal edema, rhinorrhea, nasal deformity or congestion. No epistaxis. Right sinus exhibits no maxillary sinus tenderness and no frontal sinus tenderness. Left sinus exhibits no maxillary sinus tenderness and no frontal sinus tenderness.   Mouth/Throat: Uvula is midline, oropharynx is clear and moist and mucous membranes are normal. Mucous membranes are moist. No trismus in the jaw. Normal dentition. No uvula swelling. No oropharyngeal exudate, posterior oropharyngeal edema or posterior oropharyngeal erythema. Oropharynx is clear.   Eyes: Conjunctivae and lids are normal. Pupils are equal, round, and reactive to light. No scleral icterus. Extraocular movement intact   Neck: Trachea normal and phonation normal. Neck supple. No edema present. No erythema present. No neck rigidity present.   Cardiovascular: Normal rate, regular rhythm, normal heart sounds and normal pulses.   Pulmonary/Chest: Effort normal and breath sounds normal. No accessory muscle usage. No apnea, no tachypnea and no bradypnea. No respiratory distress. He has no decreased breath sounds. He has no rhonchi.   Abdominal: Normal appearance.   Musculoskeletal: Normal range of motion.         General: No deformity. Normal range of motion.      Left hand: He exhibits laceration.   Neurological: He is alert and oriented to person, place, and time. He exhibits normal muscle tone. Coordination normal.   Skin: Skin is warm, dry, intact, not diaphoretic and not pale. Lacerations - upper ext.:  left handNo erythema   Psychiatric: His speech is normal and behavior is normal. Judgment and thought content normal.   Nursing note and  vitals reviewed.      Assessment:     1. Type 1 diabetes mellitus without complication        Plan:   I have reviewed the patient chart and pertinent past imaging/labs.   Due to patient's situation, I prescribed Insulin and Dexcom. With consistent issues with prescriptions since new PCP, I also placed an urgent Endocrinology appointment to prevent patient from being without. He denies any questions or complaints today.     Type 1 diabetes mellitus without complication  -     insulin lispro (HUMALOG U-100 INSULIN) 100 unit/mL injection; Use as instructed.Estimated total daily dose 50 units. 24 Hour Total Basal Rate 26.40 units Basal: MN 1.100 units/hr 0600 1.100 units/hr 1800 1.100 units/hr Bolus Insulin:Carb ratio MN 1: 9 gm/CHO Sensitivity: MN 1: 50 mg/dl BG Target range:  mg/dl 0600 120 mg/dl 1800 150 mg/dl  Dispense: 10 mL; Refill: 0  -     blood-glucose sensor (DEXCOM G7 SENSOR) Brea; 3 each by Misc.(Non-Drug; Combo Route) route 5 (five) times daily.  Dispense: 3 each; Refill: 0  -     Ambulatory referral/consult to Endocrinology

## 2024-01-02 NOTE — PATIENT INSTRUCTIONS
Follow up with endocrinologist: [phone]  Please return here or go to the Emergency Department for any concerns or worsening of condition.  If you were prescribed antibiotics, please take them to completion.  If you were prescribed a narcotic medication, do not drive or operate heavy equipment or machinery while taking these medications.  Please follow up with your primary care doctor or specialist as needed.    If you  smoke, please stop smoking.

## 2024-01-03 DIAGNOSIS — E10.9 TYPE 1 DIABETES MELLITUS WITHOUT COMPLICATION: Primary | ICD-10-CM

## 2024-01-03 RX ORDER — INSULIN LISPRO 100 [IU]/ML
INJECTION, SOLUTION INTRAVENOUS; SUBCUTANEOUS
Qty: 90 ML | Refills: 5 | Status: SHIPPED | OUTPATIENT
Start: 2024-01-03 | End: 2024-01-31 | Stop reason: SDUPTHER

## 2024-01-03 RX ORDER — INFUSION SET FOR INSULIN PUMP
1 INFUSION SETS-PARAPHERNALIA MISCELLANEOUS
Qty: 10 EACH | Refills: 0 | Status: SHIPPED | OUTPATIENT
Start: 2024-01-03 | End: 2024-02-21 | Stop reason: SDUPTHER

## 2024-01-31 DIAGNOSIS — E10.9 TYPE 1 DIABETES MELLITUS WITHOUT COMPLICATION: ICD-10-CM

## 2024-02-01 RX ORDER — BLOOD-GLUCOSE SENSOR
3 EACH MISCELLANEOUS
Qty: 3 EACH | Refills: 0 | Status: SHIPPED | OUTPATIENT
Start: 2024-02-01 | End: 2024-03-04 | Stop reason: SDUPTHER

## 2024-02-01 RX ORDER — INSULIN LISPRO 100 [IU]/ML
INJECTION, SOLUTION INTRAVENOUS; SUBCUTANEOUS
Qty: 90 ML | Refills: 5 | Status: SHIPPED | OUTPATIENT
Start: 2024-02-01 | End: 2024-04-05 | Stop reason: SDUPTHER

## 2024-02-19 ENCOUNTER — PATIENT MESSAGE (OUTPATIENT)
Dept: ADMINISTRATIVE | Facility: HOSPITAL | Age: 24
End: 2024-02-19
Payer: COMMERCIAL

## 2024-02-21 ENCOUNTER — PATIENT MESSAGE (OUTPATIENT)
Dept: PRIMARY CARE CLINIC | Facility: CLINIC | Age: 24
End: 2024-02-21
Payer: COMMERCIAL

## 2024-02-21 DIAGNOSIS — E10.9 TYPE 1 DIABETES MELLITUS WITHOUT COMPLICATION: ICD-10-CM

## 2024-02-21 RX ORDER — INFUSION SET FOR INSULIN PUMP
1 INFUSION SETS-PARAPHERNALIA MISCELLANEOUS
Qty: 10 EACH | Refills: 5 | Status: SHIPPED | OUTPATIENT
Start: 2024-02-21 | End: 2025-02-20

## 2024-02-21 NOTE — TELEPHONE ENCOUNTER
Please see pt message. Pt is asking about a pump supply that need to be shipped to medical supply

## 2024-03-04 ENCOUNTER — PATIENT MESSAGE (OUTPATIENT)
Dept: PRIMARY CARE CLINIC | Facility: CLINIC | Age: 24
End: 2024-03-04
Payer: COMMERCIAL

## 2024-03-04 DIAGNOSIS — E10.9 TYPE 1 DIABETES MELLITUS WITHOUT COMPLICATION: ICD-10-CM

## 2024-03-05 RX ORDER — BLOOD-GLUCOSE SENSOR
3 EACH MISCELLANEOUS
Qty: 3 EACH | Refills: 0 | Status: SHIPPED | OUTPATIENT
Start: 2024-03-05 | End: 2024-03-13

## 2024-03-07 ENCOUNTER — PATIENT MESSAGE (OUTPATIENT)
Dept: PRIMARY CARE CLINIC | Facility: CLINIC | Age: 24
End: 2024-03-07
Payer: COMMERCIAL

## 2024-03-08 ENCOUNTER — PATIENT MESSAGE (OUTPATIENT)
Dept: PRIMARY CARE CLINIC | Facility: CLINIC | Age: 24
End: 2024-03-08
Payer: COMMERCIAL

## 2024-03-08 DIAGNOSIS — E10.9 TYPE 1 DIABETES MELLITUS WITHOUT COMPLICATION: Primary | ICD-10-CM

## 2024-03-08 RX ORDER — INSULIN GLARGINE 100 [IU]/ML
10 INJECTION, SOLUTION SUBCUTANEOUS DAILY
Qty: 15 ML | Refills: 0 | Status: SHIPPED | OUTPATIENT
Start: 2024-03-08 | End: 2024-04-07

## 2024-03-08 RX ORDER — INSULIN LISPRO 100 [IU]/ML
3-5 INJECTION, SOLUTION INTRAVENOUS; SUBCUTANEOUS
Qty: 15 ML | Refills: 0 | Status: SHIPPED | OUTPATIENT
Start: 2024-03-08 | End: 2024-04-07

## 2024-03-08 RX ORDER — INSULIN GLARGINE 100 [IU]/ML
10 INJECTION, SOLUTION SUBCUTANEOUS DAILY
Qty: 1.5 ML | Refills: 0 | Status: SHIPPED | OUTPATIENT
Start: 2024-03-08 | End: 2024-03-08

## 2024-03-08 RX ORDER — INSULIN ASPART 100 [IU]/ML
3-5 INJECTION, SOLUTION INTRAVENOUS; SUBCUTANEOUS
Qty: 2.25 ML | Refills: 0 | Status: SHIPPED | OUTPATIENT
Start: 2024-03-08 | End: 2024-03-08

## 2024-03-08 NOTE — TELEPHONE ENCOUNTER
Message sent to pt. Will call in long/short acting insulin for him until his Endo appt on Wed if unable to obtain insulin pump/supplies.

## 2024-03-08 NOTE — TELEPHONE ENCOUNTER
[facility]  Please rewrite the rx for a box because [facility] cannot break up a box and send the rx down to them.

## 2024-03-08 NOTE — TELEPHONE ENCOUNTER
Spoke with patient and we also need the order for the 3ml cartridge that attaches to the pump.  If these supplies cannot be procured and delivered to patient today, can you please order lantus or another long acting insulin to get him through the weekend at least?      I have a call in to [facility] to discuss the supplies.  I will call them again if they have not called me back by 1:45 pm today.

## 2024-03-12 NOTE — PROGRESS NOTES
CC: This 23 y.o. White male  is here for evaluation of  T1DM along with comorbidities indicated in the Visit Diagnosis section of this encounter.    HPI: Shadi Velazquez was diagnosed with T1DM in 2012. He has been on an insulin pump since 2013.     DM COMPLICATIONS: none    New to Endocrine. Arrives with wife.     He changed from brand Humalog to insulin lispro per insurance formulary earlier this year and finds the latter not as effective and BGs are now higher.    He has been off insulin pump for the last few days d/t inability to get pump supplies. Needs higher doses of insulin with injections versus pump. He started with Lantus 10 units daily but increased to 18 units last night and BGs are still high today.     He was not using Control IQ. Tried  Basal IQ  but stopped d/t hypoglycemia.       LAST DIABETES EDUCATION: years ago       HOSPITALIZED FOR DIABETES -  Yes -  DKA upon dx .    SIGNIFICANT DIABETES MED HISTORY:       PRESCRIBED DIABETES MEDICATIONS:   Insulin lispro in Tandem pump (obtained around mid 2019)     Pt does not bring in his insulin pump. Recalls ICR is 8 and TDD on insulin pump was around 36 units.     Denies missed or late boluses. Boluses before snacks and meals.         SELF MONITORING BLOOD GLUCOSE: Dexcom G7     CGM interpretation: glucoses in the first half of the report while pt was on the insulin pump showed better glucose control with highest BGs after meals - perhaps d/t undercounting carbs. Mild hypoglycemia in the morning/daytime noted d/t physical activity and/or excessive basal insulin. Glucoses on MDI lately are more erratic compared to on insulin pump.       HYPOGLYCEMIC EPISODES: often feels his BG is low then 100. Occurs day/night, but more often triggered by physical activity.    Carries glucose gel.     CURRENT DIET: drinks water, SF drinks.   Averages 30-50 grams carbs per meal.   Usually skips breakfast. Eats lunch and dinner. Snacks before and after lunch.     Diet recall:  "lunch was chicken. midday snack was cheese crackers. Dinner was potstickers and broccoli. Ice cream.     CURRENT EXERCISE: none recent     SOCIAL: [employer]       /64   Pulse 82   Temp 97.8 °F (36.6 °C)   Wt 72.1 kg (159 lb)   BMI 24.90 kg/m²     ROS:   CONSTITUTIONAL: Appetite good, +  fatigue  EYES: +  visual disturbances  : +  urinary frequency   NEURO: No paresthesias.   OTHER: no polydipsia     PHYSICAL EXAM:  GENERAL: Well developed, well nourished. No acute distress.   PSYCH: AAOx3, appropriate mood and affect, conversant, well-groomed. Judgement and insight good.   NEURO: Cranial nerves grossly intact. Speech clear.   CHEST: Respirations even and unlabored.   SKIN: no acanthosis nigricans.    FEET:     No results found for: "HGBA1C"    No results found for: "CPEPTIDE", "GLUTAMICACID", "ISLETCELLANT", "FRUCTOSAMINE"     Lab Results   Component Value Date    CHOL 123 11/18/2023     Lab Results   Component Value Date    HDL 42 11/18/2023     Lab Results   Component Value Date    LDLCALC 55.2 (L) 11/18/2023     Lab Results   Component Value Date    TRIG 129 11/18/2023     Lab Results   Component Value Date    CHOLHDL 34.1 11/18/2023           Component Value Date/Time     11/18/2023 1018    K 4.0 11/18/2023 1018     11/18/2023 1018    CO2 26 11/18/2023 1018    BUN 14 11/18/2023 1018    CREATININE 0.9 11/18/2023 1018     (H) 11/18/2023 1018    CALCIUM 9.5 11/18/2023 1018    ALKPHOS 88 11/18/2023 1018    AST 19 11/18/2023 1018    ALT 29 11/18/2023 1018    BILITOT 0.9 11/18/2023 1018    EGFRNORACEVR >60.0 11/18/2023 1018         No results found for: "LABMICR", "CREATRANDUR", "MICALBCREAT"    ASSESSMENT and PLAN:    A1C GOAL:     1. Type 1 diabetes mellitus without complication  A1c today and will submit orders for insulin pump supplies for Tandem pump.     Patient also interested in Omnipod 5 since it's tubeless.   Prescription for Omnipod 5 sent to [facility]. "     See Diabetes Education for Omnipod 5 start.  --- may need to start Automated Mode later when pt can get a refill for Dexcom and change to G6.     Send Tandem insulin pump report or at least the insulin pump settings to office so that they can be optimized for Omnipod start.     Prescription for Baqsimi sent. Reviewed how to correct hypoglycemia.     Until insulin pump can be resumed, inject Lantus 10 units every 12 hours. Continue carb ratio of 8. Limit carbs.   Resume insulin pump 20 hours after last dose of Lantus.       Return to clinic in 6 weeks.     Schedule diabetic eye exam.         Ambulatory referral/consult to Diabetes Education    Ambulatory referral/consult to Optometry    Hemoglobin A1C    Glutamic Acid Decarboxylase      2. Presence of insulin pump            Orders Placed This Encounter   Procedures    Hemoglobin A1C     Standing Status:   Future     Number of Occurrences:   1     Standing Expiration Date:   5/12/2025    Glutamic Acid Decarboxylase     Standing Status:   Future     Number of Occurrences:   1     Standing Expiration Date:   5/12/2025    Ambulatory referral/consult to Diabetes Education     Standing Status:   Future     Standing Expiration Date:   3/13/2025     Referral Priority:   Routine     Referral Type:   Consultation     Referral Reason:   Specialty Services Required     Requested Specialty:   Endocrinology     Number of Visits Requested:   1     Expiration Date:   3/13/2025    Ambulatory referral/consult to Optometry     Standing Status:   Future     Standing Expiration Date:   4/13/2025     Referral Priority:   Routine     Referral Type:   Vision (Optometry)     Referral Reason:   Specialty Services Required     Requested Specialty:   Optometry     Number of Visits Requested:   1        Follow up in about 6 weeks (around 4/24/2024).     Thank you very much for allowing me to participate in Shadi Velazquez's care.

## 2024-03-13 ENCOUNTER — PATIENT MESSAGE (OUTPATIENT)
Dept: ENDOCRINOLOGY | Facility: CLINIC | Age: 24
End: 2024-03-13

## 2024-03-13 ENCOUNTER — LAB VISIT (OUTPATIENT)
Dept: LAB | Facility: HOSPITAL | Age: 24
End: 2024-03-13
Attending: NURSE PRACTITIONER
Payer: COMMERCIAL

## 2024-03-13 ENCOUNTER — OFFICE VISIT (OUTPATIENT)
Dept: ENDOCRINOLOGY | Facility: CLINIC | Age: 24
End: 2024-03-13
Payer: COMMERCIAL

## 2024-03-13 VITALS
WEIGHT: 159 LBS | TEMPERATURE: 98 F | BODY MASS INDEX: 24.9 KG/M2 | DIASTOLIC BLOOD PRESSURE: 64 MMHG | SYSTOLIC BLOOD PRESSURE: 108 MMHG | HEART RATE: 82 BPM

## 2024-03-13 DIAGNOSIS — Z96.41 PRESENCE OF INSULIN PUMP: ICD-10-CM

## 2024-03-13 DIAGNOSIS — E10.9 TYPE 1 DIABETES MELLITUS WITHOUT COMPLICATION: Primary | ICD-10-CM

## 2024-03-13 DIAGNOSIS — E10.9 TYPE 1 DIABETES MELLITUS WITHOUT COMPLICATION: ICD-10-CM

## 2024-03-13 PROCEDURE — 1159F MED LIST DOCD IN RCRD: CPT | Mod: CPTII,S$GLB,, | Performed by: NURSE PRACTITIONER

## 2024-03-13 PROCEDURE — 95251 CONT GLUC MNTR ANALYSIS I&R: CPT | Mod: S$GLB,,, | Performed by: NURSE PRACTITIONER

## 2024-03-13 PROCEDURE — 99999 PR PBB SHADOW E&M-EST. PATIENT-LVL IV: CPT | Mod: PBBFAC,,, | Performed by: NURSE PRACTITIONER

## 2024-03-13 PROCEDURE — 3008F BODY MASS INDEX DOCD: CPT | Mod: CPTII,S$GLB,, | Performed by: NURSE PRACTITIONER

## 2024-03-13 PROCEDURE — 3074F SYST BP LT 130 MM HG: CPT | Mod: CPTII,S$GLB,, | Performed by: NURSE PRACTITIONER

## 2024-03-13 PROCEDURE — 3078F DIAST BP <80 MM HG: CPT | Mod: CPTII,S$GLB,, | Performed by: NURSE PRACTITIONER

## 2024-03-13 PROCEDURE — 99214 OFFICE O/P EST MOD 30 MIN: CPT | Mod: S$GLB,,, | Performed by: NURSE PRACTITIONER

## 2024-03-13 PROCEDURE — 86341 ISLET CELL ANTIBODY: CPT | Performed by: NURSE PRACTITIONER

## 2024-03-13 PROCEDURE — 1160F RVW MEDS BY RX/DR IN RCRD: CPT | Mod: CPTII,S$GLB,, | Performed by: NURSE PRACTITIONER

## 2024-03-13 PROCEDURE — 36415 COLL VENOUS BLD VENIPUNCTURE: CPT | Performed by: NURSE PRACTITIONER

## 2024-03-13 PROCEDURE — 83036 HEMOGLOBIN GLYCOSYLATED A1C: CPT | Performed by: NURSE PRACTITIONER

## 2024-03-13 RX ORDER — GLUCAGON 3 MG/1
POWDER NASAL
Qty: 2 EACH | Refills: 0 | Status: SHIPPED | OUTPATIENT
Start: 2024-03-13

## 2024-03-13 RX ORDER — BLOOD-GLUCOSE TRANSMITTER
EACH MISCELLANEOUS
Qty: 1 EACH | Refills: 3 | Status: SHIPPED | OUTPATIENT
Start: 2024-03-13

## 2024-03-13 RX ORDER — INSULIN PMP CART,AUT,G6/7,CNTR
1 EACH SUBCUTANEOUS
Qty: 1 EACH | Refills: 0 | Status: SHIPPED | OUTPATIENT
Start: 2024-03-13

## 2024-03-13 RX ORDER — INSULIN PMP CART,AUT,G6/7,CNTR
1 EACH SUBCUTANEOUS
Qty: 15 EACH | Refills: 0 | Status: SHIPPED | OUTPATIENT
Start: 2024-03-13 | End: 2024-05-09 | Stop reason: SDUPTHER

## 2024-03-13 RX ORDER — BLOOD-GLUCOSE SENSOR
EACH MISCELLANEOUS
Qty: 3 EACH | Refills: 11 | Status: SHIPPED | OUTPATIENT
Start: 2024-03-13

## 2024-03-13 NOTE — PATIENT INSTRUCTIONS
A1c today and will submit orders for insulin pump supplies for Tandem pump.     Patient also interested in Omnipod 5 since it's tubeless.   Prescription for Omnipod 5 sent to [facility].     See Diabetes Education for Omnipod 5 start.  --- may need to start Automated Mode later when pt can get a refill for Dexcom and change to G6.     Send insulin pump report or at least the insulin pump settings to office.     Prescription for Baqsimi sent. Reviewed how to correct hypoglycemia.     Return to clinic in 6 weeks.     Schedule diabetic eye exam.     Until insulin pump can be resumed, inject Lantus 10 units every 12 hours. Continue carb ratio of 8. Limit carbs.   Resume insulin pump 20 hours after last dose of Lantus.

## 2024-03-14 LAB
ESTIMATED AVG GLUCOSE: 148 MG/DL (ref 68–131)
HBA1C MFR BLD: 6.8 % (ref 4–5.6)

## 2024-03-19 ENCOUNTER — CLINICAL SUPPORT (OUTPATIENT)
Dept: DIABETES | Facility: CLINIC | Age: 24
End: 2024-03-19
Payer: COMMERCIAL

## 2024-03-19 DIAGNOSIS — E10.9 TYPE 1 DIABETES MELLITUS WITHOUT COMPLICATION: ICD-10-CM

## 2024-03-19 PROCEDURE — G0108 DIAB MANAGE TRN  PER INDIV: HCPCS | Mod: S$GLB,,, | Performed by: NURSE PRACTITIONER

## 2024-03-19 PROCEDURE — 99999 PR PBB SHADOW E&M-EST. PATIENT-LVL II: CPT | Mod: PBBFAC,,,

## 2024-03-20 VITALS — WEIGHT: 158.94 LBS | BODY MASS INDEX: 24.9 KG/M2

## 2024-03-20 NOTE — PROGRESS NOTES
Diabetes Care Specialist Progress Note  Author: Lynnette Durbin RN, CDE  Date: 3/19/2024    Program Intake  Reason for Diabetes Program Visit:: Intervention  Type of Intervention:: Individual  Individual: Device Training  Device Training: Insulin Pump Upgrade  Current diabetes risk level:: low  In the last 12 months, have you:: none  Permission to speak with others about care:: yes (Spouse present at visit)  Continuous Glucose Monitoring  Patient has CGM: Yes  Personal CGM type:: Dexcom G6    Lab Results   Component Value Date    HGBA1C 6.8 (H) 03/13/2024       Clinical    Weight: 72.1 kg (158 lb 15.2 oz)       Body mass index is 24.9 kg/m².    Patient Health Rating  Compared to other people your age, how would you rate your health?: Good    Problem Review  Reviewed Problem List with Patient: no (see comments)  Active comorbidities affecting diabetes self-care.: no  Reviewed health maintenance: yes    Clinical Assessment  Current Diabetes Treatment: Insulin pump (Tandem X2)    Medication Information  How do you obtain your medications?: Patient drives  How many days a week do you miss your medications?: Never  Do you sometimes have difficulty refilling your medications?: No  Medication adherence impacting ability to self-manage diabetes?: No         Nutritional Status  Diet: Diabetic diet  Change in appetite?: No  Dentation:: Intact  Recent Changes in Weight: No Recent Weight Change  Current nutritional status an area of need that is impacting patient's ability to self-manage diabetes?: No    Additional Social History    Support  Does anyone support you with your diabetes care?: yes  Who supports you?: spouse  Who takes you to your medical appointments?: self  Is Support an area impacting ability to self-manage diabetes?: No    Access to Mass Media & Technology  Does the patient have access to any of the following devices or technologies?: Smart phone, Internet Access  Media or technology needs impacting ability to  self-manage diabetes?: No    Cognitive/Behavioral Health  Alert and Oriented: Yes  Difficulty Thinking: No  Requires Prompting: No  Requires assistance for routine expression?: No  Cognitive or behavioral barriers impacting ability to self-manage diabetes?: No         Communication  Language preference: English  Hearing Problems: No  Vision Problems: No  Communication needs impacting ability to self-manage diabetes?: No    Health Literacy  Preferred Learning Method: Face to Face, Hands On  How often do you need to have someone help you read instructions, pamphlets, or written material from your doctor or pharmacy?: Never  Health literacy needs impacting ability to self-manage diabetes?: No      Diabetes Self-Management Skills Assessment    Diabetes Disease Process/Treatment Options  Patient/caregiver able to state what happens when someone has diabetes.: yes  Patient/caregiver knows what type of diabetes they have.: yes  Diabetes Type : Type I  Diabetes Disease Process/Treatment Options: Skills Assessment Completed: Yes  Assessment indicates:: Adequate understanding  Area of need?: No    Nutrition/Healthy Eating  Method of carbohydrate measurement:: carb counting/reading labels  Patient can identify foods that impact blood sugar.: yes  Patient-identified foods:: starchy vegetables (corn, peas, beans), sweets, yogurt, soda, milk, fruit/fruit juice, starches (bread, pasta, rice, cereal)  Nutrition/Healthy Eating Skills Assessment Completed:: Yes  Assessment indicates:: Adequate understanding  Area of need?: No    Physical Activity/Exercise  Patient's daily activity level:: constantly moving  Patient formally exercises outside of work.: no  Reasons for not exercising:: time constraints  Patient can identify forms of physical activity.: yes  Stated forms of physical activity:: any movement performed by muscles that uses energy, manual activity at work  Physical Activity/Exercise Skills Assessment Completed: :  Yes  Assessment indicates:: Adequate understanding  Area of need?: No    Medications  Patient is able to describe current diabetes management routine.: yes  Diabetes management routine:: insulin pump  Patient is able to identify current diabetes medications, dosages, and appropriate timing of medications.: yes  Patient reports problems or concerns with current medication regimen.: no  Medication Skills Assessment Completed:: Yes  Assessment indicates:: Adequate understanding  Area of need?: No    Home Blood Glucose Monitoring  Personal CGM type:: Dexcom G6  Home Blood Glucose Monitoring Skills Assessment Completed: : Yes  Assessment indicates:: Adequate understanding  Area of need?: No    Acute Complications  Patient is able to identify types of acute complications: Yes  Patient Identified:: Hypoglycemia  Patient is able to state the basic meaning of hypoglycemia?: Yes  Able to state the blood sugar range for hypoglycemia?: yes  Patient stated range:: less than 70  Patient can identify general symptoms of hypoglycemia: yes  Patient identified:: shakiness  Able to state proper treatment of hypoglycemia?: yes  Patient identified:: 1/2 can soda/fruit juice, 5-6 pieces of hard candy  Acute Complications Skills Assessment Completed: : Yes  Assessment indicates:: Adequate understanding  Area of need?: No    Chronic Complications  Chronic Complications Skills Assessment Completed: : No  Deferred due to:: Time    Psychosocial/Coping  Patient can identify ways of coping with chronic disease.: yes  Patient-stated ways of coping with chronic disease:: support from loved ones  Psychosocial/Coping Skills Assessment Completed: : Yes  Assessment indicates:: Adequate understanding  Area of need?: No      Assessment Summary and Plan    Based on today's diabetes care assessment, the following areas of need were identified:          3/19/2024    12:01 AM   Social   Support No   Access to Mass Media/Tech No   Cognitive/Behavioral  Health No   Communication No   Health Literacy No            3/19/2024    12:01 AM   Clinical   Medication Adherence No   Nutritional Status No            3/19/2024    12:01 AM   Diabetes Self-Management Skills   Diabetes Disease Process/Treatment Options No   Nutrition/Healthy Eating No   Physical Activity/Exercise No   Medication No   Home Blood Glucose Monitoring No   Acute Complications No   Psychosocial/Coping No          Today's interventions were provided through individual discussion, instruction, and written materials were provided.      Patient verbalized understanding of instruction and written materials.  Pt was able to return back demonstration of instructions today. Patient understood key points, needs reinforcement and further instruction.     Diabetes Self-Management Care Plan:    Today's Diabetes Self-Management Care Plan was developed with Shadi's input. Shadi has agreed to work toward the following goal(s) to improve his/her overall diabetes control.      Care Plan: Diabetes Management   Updates made since 2/19/2024 12:00 AM        Problem: Medications         Long-Range Goal: Patient Agrees to take Diabetes Medication(s) as prescribed. Will begin using his Omnipod 5 pump with Dexcom G6    Start Date: 3/19/2024   Expected End Date: 3/28/2024   Priority: High   Barriers: No Barriers Identified   Note:    OMNI POD 5 INSULIN PUMP START    Pump training was provided per Omni Pod protocol.  Patient has used an insulin pump in the past.   Settings for pump transferred from current Tandem X2 pump. Unable to download current pump due to software issue.      Patient is not new to insulin pump therapy but this will be first Automated system he will be using.       Basal:     Max basal rate: 3 units/hr     Bolus:  CHO ratio: 12 am  1: 9; 10:30 am  1:8  ISF: 1:50      Active insulin time: 4 hours  Max bolus: 25 units     Low reservoir insulin alarm: 10 units  Change pod alarm: every 3 days       Details of pump  therapy were covered included following controller features and programming, pod activation, pod site selection and rotation, automatic pod priming and insertion, setting & editing basal rates in manual mode, giving bolus and other features in the set-up menu.  The following regarding the Omnipod 5 was covered:  During your first Pod wear, since no recent history is available, the Omnipod 5 System uses only your active Basal Program from Manual Mode as a starting point to adjust your insulin. After 48 hours of history is collected, which usually happens with the first Pod wear, SmartKili technology stops adjusting insulin against your active Basal Program and starts using the Adaptive Basal Rate for your automated insulin delivery with your next Pod change. With each Pod change, insulin delivery information is sent and saved in the Omnipod 5 Zack so that the next Pod that is started is updated with the new Adaptive Basal Rate. With each new Pod activation, the system adapts insulin delivery based on physiological needs and total daily insulin (TDI) delivered. After 2-3 Pod changes, adaptation generally stabilizes and automated insulin delivery is based on this adaptation.  Patient demonstrated ability to program controller, activate and insert pod using aseptic technique.  Patient demonstrated ability to program Dexcom transmitter into controller and start automated limited mode.    Instructed pt on use of basic pump features ie...give a bolus, pause insulin, switch from manual to automated mode.  Reviewed features available in manual mode verses automated mode.   Reviewed when and how to use activity function in automated mode.  Reviewed site selection of pods, rotation of sites and hard stop to change pod every 72 hrs.   Instructed that insulin vial is good out of refrigeration for up to 28-30 days.   Reviewed treatment of hypoglycemia, hyperglycemia; sick day care, DKA, and troubleshooting of pump. Advised to  change site if issue is suspected.   Omni Pod 24-hour support line provided.      Patient had been using a Dexcom G7.  He is transitioning to G6 because of pump compatibility.      Podder username: Maurice  Podder password: [email or web address]!     Jacinto username: [email or web address]  Jacinto password: [email or web address]!    Dexcom was connected directly to clinic account               Follow Up Plan     F/u appt made in 1 week    Today's care plan and follow up schedule was discussed with patient.  Shadi verbalized understanding of the care plan, goals, and agrees to follow up plan.        The patient was encouraged to communicate with his/her health care provider/physician and care team regarding his/her condition(s) and treatment.  I provided the patient with my contact information today and encouraged to contact me via phone or [facility] Patient Portal as needed.     Length of Visit   Total Time: 120 Minutes

## 2024-03-21 LAB — GAD65 AB SER-SCNC: 0.58 NMOL/L

## 2024-04-02 ENCOUNTER — CLINICAL SUPPORT (OUTPATIENT)
Dept: DIABETES | Facility: CLINIC | Age: 24
End: 2024-04-02
Payer: COMMERCIAL

## 2024-04-02 ENCOUNTER — TELEPHONE (OUTPATIENT)
Dept: PRIMARY CARE CLINIC | Facility: CLINIC | Age: 24
End: 2024-04-02
Payer: COMMERCIAL

## 2024-04-02 DIAGNOSIS — E10.9 TYPE 1 DIABETES MELLITUS WITHOUT COMPLICATION: Primary | ICD-10-CM

## 2024-04-02 PROCEDURE — 99999 PR PBB SHADOW E&M-EST. PATIENT-LVL I: CPT | Mod: PBBFAC,,,

## 2024-04-02 PROCEDURE — G0108 DIAB MANAGE TRN  PER INDIV: HCPCS | Mod: S$GLB,,, | Performed by: NURSE PRACTITIONER

## 2024-04-02 NOTE — TELEPHONE ENCOUNTER
----- Message from Margarito Reyes sent at 4/1/2024  3:06 PM CDT -----  Contact: [facility] [employer] [phone]  He faxed a medical necessity form on 3/28/24 for [employer] insulin supplies    Thank you

## 2024-04-03 NOTE — PROGRESS NOTES
Diabetes Care Specialist Follow-up Note  Author: Lynnette Durbin RN, CDE  Date: 4/2/2024    Program Intake  Reason for Diabetes Program Visit:: Intervention  Type of Intervention:: Individual  Individual: Device Training  Device Training: Other (Follow up pump start)  Current diabetes risk level:: low  Permission to speak with others about care:: yes (Spouse present at visit)  Continuous Glucose Monitoring  Patient has CGM: Yes  Personal CGM type:: Dexcom G6    Lab Results   Component Value Date    HGBA1C 6.8 (H) 03/13/2024       Clinical    Problem Review  Reviewed Problem List with Patient: no (see comments)  Active comorbidities affecting diabetes self-care.: no  Reviewed health maintenance: yes    Additional Social History    Support  Does anyone support you with your diabetes care?: yes    Access to [employer]  Does the patient have access to any of the following devices or technologies?: Smart phone, Internet Access      Health Literacy  Preferred Learning Method: Face to Face, Hands On      Diabetes Self-Management Skills Assessment     Diabetes Disease Process/Treatment Options  Patient/caregiver able to state what happens when someone has diabetes.: yes  Patient/caregiver knows what type of diabetes they have.: yes  Diabetes Type : Type I  Assessment indicates:: Adequate understanding  Area of need?: No    Nutrition/Healthy Eating  Method of carbohydrate measurement:: carb counting/reading labels  Patient can identify foods that impact blood sugar.: yes  Patient-identified foods:: starchy vegetables (corn, peas, beans), sweets, yogurt, soda, milk, fruit/fruit juice, starches (bread, pasta, rice, cereal)  Assessment indicates:: Adequate understanding  Area of need?: No    Physical Activity/Exercise  Patient's daily activity level:: constantly moving  Patient formally exercises outside of work.: no  Reasons for not exercising:: time constraints  Patient can identify forms of physical activity.:  yes  Stated forms of physical activity:: any movement performed by muscles that uses energy, manual activity at work  Assessment indicates:: Adequate understanding  Area of need?: No    Medications  Patient is able to describe current diabetes management routine.: yes  Diabetes management routine:: insulin pump  Patient is able to identify current diabetes medications, dosages, and appropriate timing of medications.: yes  Patient reports problems or concerns with current medication regimen.: no  Assessment indicates:: Adequate understanding  Area of need?: No    Home Blood Glucose Monitoring  Patient states that blood sugar is checked at home daily.: yes  Monitoring Method:: personal continuous glucose monitor  Personal CGM type:: Dexcom G6  Patient is able to use personal CGM appropriately.: yes  CGM Report reviewed?: yes  Assessment indicates:: Adequate understanding  Area of need?: No    Acute Complications  Able to state the blood sugar range for hypoglycemia?: yes  Patient can identify general symptoms of hypoglycemia: yes  Patient identified:: shakiness  Able to state proper treatment of hypoglycemia?: yes  Patient identified:: 1/2 can soda/fruit juice, 5-6 pieces of hard candy  Assessment indicates:: Adequate understanding  Area of need?: No    Chronic Complications  Deferred due to:: Time    Psychosocial/Coping  Patient can identify ways of coping with chronic disease.: yes  Patient-stated ways of coping with chronic disease:: support from loved ones  Assessment indicates:: Adequate understanding  Area of need?: No      During today's follow-up visit,  the following areas required further assessment and content was provided/reviewed.    Based on today's diabetes care assessment, the following areas of need were identified:          3/19/2024    12:01 AM   Social   Support No   Access to Mass Media/Tech No   Cognitive/Behavioral Health No   Communication No   Health Literacy No            3/19/2024    12:01  AM   Clinical   Medication Adherence No   Nutritional Status No            4/2/2024    12:01 AM   Diabetes Self-Management Skills   Diabetes Disease Process/Treatment Options No   Nutrition/Healthy Eating No   Physical Activity/Exercise No   Medication No, see care planning   Home Blood Glucose Monitoring No   Acute Complications No   Psychosocial/Coping No        Today's interventions were provided through individual discussion, instruction, and written materials were provided.    Patient verbalized understanding of instruction and written materials.  Pt was able to return back demonstration of instructions today. Patient understood key points, needs reinforcement and further instruction.     Diabetes Self-Management Care Plan Review and Evaluation of Progress:    During today's follow-up Shadi's Diabetes Self-Management Care Plan progress was reviewed and progress was evaluated including his/her input. Shadi has agreed to continue his/her journey to improve/maintain overall diabetes control by continuing to set health goals. See care plan progress below.      Care Plan: Diabetes Management   Updates made since 3/4/2024 12:00 AM        Problem: Medications         Long-Range Goal: Patient Agrees to take Diabetes Medication(s) as prescribed. Will begin using his Omnipod 5 pump with Dexcom G6    Start Date: 3/19/2024   Expected End Date: 3/28/2024   Priority: High   Barriers: No Barriers Identified   Note:    OMNI POD 5 INSULIN PUMP START    Pump training was provided per Omni Pod protocol.  Patient has used an insulin pump in the past.   Settings for pump transferred from current Tandem X2 pump. Unable to download current pump due to software issue.      Patient is not new to insulin pump therapy but this will be first Automated system he will be using.       Basal:     Max basal rate: 3 units/hr     Bolus:  CHO ratio: 12 am  1: 9; 10:30 am  1:8  ISF: 1:50      Active insulin time: 4 hours  Max bolus: 25 units     Low  reservoir insulin alarm: 10 units  Change pod alarm: every 3 days       Details of pump therapy were covered included following controller features and programming, pod activation, pod site selection and rotation, automatic pod priming and insertion, setting & editing basal rates in manual mode, giving bolus and other features in the set-up menu.  The following regarding the Omnipod 5 was covered:  During your first Pod wear, since no recent history is available, the Omnipod 5 System uses only your active Basal Program from Manual Mode as a starting point to adjust your insulin. After 48 hours of history is collected, which usually happens with the first Pod wear, SmartRampedMedia technology stops adjusting insulin against your active Basal Program and starts using the Adaptive Basal Rate for your automated insulin delivery with your next Pod change. With each Pod change, insulin delivery information is sent and saved in the Omnipod 5 Zack so that the next Pod that is started is updated with the new Adaptive Basal Rate. With each new Pod activation, the system adapts insulin delivery based on physiological needs and total daily insulin (TDI) delivered. After 2-3 Pod changes, adaptation generally stabilizes and automated insulin delivery is based on this adaptation.  Patient demonstrated ability to program controller, activate and insert pod using aseptic technique.  Patient demonstrated ability to program Dexcom transmitter into controller and start automated limited mode.    Instructed pt on use of basic pump features ie...give a bolus, pause insulin, switch from manual to automated mode.  Reviewed features available in manual mode verses automated mode.   Reviewed when and how to use activity function in automated mode.  Reviewed site selection of pods, rotation of sites and hard stop to change pod every 72 hrs.   Instructed that insulin vial is good out of refrigeration for up to 28-30 days.   Reviewed treatment of  hypoglycemia, hyperglycemia; sick day care, DKA, and troubleshooting of pump. Advised to change site if issue is suspected.   Omni Pod 24-hour support line provided.      Patient had been using a Dexcom G7.  He is transitioning to G6 because of pump compatibility.      Podder username: [email or web address]  Podder password: [email or web address]     Jacinto username: [email or web address]  Glocitlali password: [email or web address]!    Dexcom was connected directly to clinic account    Update to care planning 04/02/2024: Patient seen for Omnipod 5 follow up.  Download was reviewed with patient and uploaded to media.  Overall doing well.  States likes the tubeless system.  Adjusted his I:C ratios and BG correction thresholds due to elevations noted especailly in the afternoon which is bothersome to the patient. Occasional dips below his overnight target of 150 and 120 during the day, but none noted below 70. He prefers to stay a little higher because of his occupation ().  Will evaluate the effectiveness of the changes next week.  He is scheduled to f/u with Endo provider in May.                    Follow Up Plan     F/u with Endo provider 5/1/24    Today's care plan and follow up schedule was discussed with patient.  Shadi verbalized understanding of the care plan, goals, and agrees to follow up plan.        The patient was encouraged to communicate with his/her health care provider/physician and care team regarding his/her condition(s) and treatment.  I provided the patient with my contact information today and encouraged to contact me via phone or [facility] Patient Portal as needed.     Length of Visit   Total Time: 60 Minutes

## 2024-04-05 DIAGNOSIS — E10.9 TYPE 1 DIABETES MELLITUS WITHOUT COMPLICATION: ICD-10-CM

## 2024-04-05 RX ORDER — INSULIN LISPRO 100 [IU]/ML
INJECTION, SOLUTION INTRAVENOUS; SUBCUTANEOUS
Qty: 90 ML | Refills: 5 | Status: SHIPPED | OUTPATIENT
Start: 2024-04-05 | End: 2024-04-17 | Stop reason: SDUPTHER

## 2024-04-05 NOTE — TELEPHONE ENCOUNTER
No care due was identified.  [facility] Embedded Care Due Messages. Reference number: [id number].   4/05/2024 4:07:26 PM CDT

## 2024-04-08 ENCOUNTER — TELEPHONE (OUTPATIENT)
Dept: URGENT CARE | Facility: CLINIC | Age: 24
End: 2024-04-08
Payer: COMMERCIAL

## 2024-04-17 DIAGNOSIS — E10.9 TYPE 1 DIABETES MELLITUS WITHOUT COMPLICATION: ICD-10-CM

## 2024-04-17 RX ORDER — INSULIN LISPRO 100 [IU]/ML
INJECTION, SOLUTION INTRAVENOUS; SUBCUTANEOUS
Qty: 90 ML | Refills: 5 | Status: SHIPPED | OUTPATIENT
Start: 2024-04-17 | End: 2024-05-16 | Stop reason: SDUPTHER

## 2024-04-17 NOTE — TELEPHONE ENCOUNTER
LOV 11/13/23     Pt would like Rx send to [facility]  Please Cancel Rx at [facility]

## 2024-04-17 NOTE — TELEPHONE ENCOUNTER
No care due was identified.  [facility] Embedded Care Due Messages. Reference number: [id number].   4/17/2024 4:47:29 AM CDT

## 2024-05-09 ENCOUNTER — PATIENT MESSAGE (OUTPATIENT)
Dept: ENDOCRINOLOGY | Facility: CLINIC | Age: 24
End: 2024-05-09
Payer: COMMERCIAL

## 2024-05-09 DIAGNOSIS — E10.9 TYPE 1 DIABETES MELLITUS WITHOUT COMPLICATION: Primary | ICD-10-CM

## 2024-05-09 RX ORDER — INSULIN PMP CART,AUT,G6/7,CNTR
1 EACH SUBCUTANEOUS
Qty: 15 EACH | Refills: 0 | Status: SHIPPED | OUTPATIENT
Start: 2024-05-09 | End: 2024-05-16 | Stop reason: SDUPTHER

## 2024-05-16 ENCOUNTER — OFFICE VISIT (OUTPATIENT)
Dept: ENDOCRINOLOGY | Facility: CLINIC | Age: 24
End: 2024-05-16
Payer: COMMERCIAL

## 2024-05-16 ENCOUNTER — PATIENT OUTREACH (OUTPATIENT)
Dept: ADMINISTRATIVE | Facility: HOSPITAL | Age: 24
End: 2024-05-16
Payer: COMMERCIAL

## 2024-05-16 VITALS
HEART RATE: 110 BPM | TEMPERATURE: 99 F | BODY MASS INDEX: 24.75 KG/M2 | WEIGHT: 158 LBS | SYSTOLIC BLOOD PRESSURE: 112 MMHG | DIASTOLIC BLOOD PRESSURE: 70 MMHG

## 2024-05-16 DIAGNOSIS — E10.9 TYPE 1 DIABETES MELLITUS WITHOUT COMPLICATION: Primary | ICD-10-CM

## 2024-05-16 DIAGNOSIS — Z96.41 PRESENCE OF INSULIN PUMP: ICD-10-CM

## 2024-05-16 PROCEDURE — 95251 CONT GLUC MNTR ANALYSIS I&R: CPT | Mod: S$GLB,,, | Performed by: NURSE PRACTITIONER

## 2024-05-16 PROCEDURE — 99999 PR PBB SHADOW E&M-EST. PATIENT-LVL III: CPT | Mod: PBBFAC,,, | Performed by: NURSE PRACTITIONER

## 2024-05-16 PROCEDURE — 3078F DIAST BP <80 MM HG: CPT | Mod: CPTII,S$GLB,, | Performed by: NURSE PRACTITIONER

## 2024-05-16 PROCEDURE — 1160F RVW MEDS BY RX/DR IN RCRD: CPT | Mod: CPTII,S$GLB,, | Performed by: NURSE PRACTITIONER

## 2024-05-16 PROCEDURE — 3008F BODY MASS INDEX DOCD: CPT | Mod: CPTII,S$GLB,, | Performed by: NURSE PRACTITIONER

## 2024-05-16 PROCEDURE — 1159F MED LIST DOCD IN RCRD: CPT | Mod: CPTII,S$GLB,, | Performed by: NURSE PRACTITIONER

## 2024-05-16 PROCEDURE — 3044F HG A1C LEVEL LT 7.0%: CPT | Mod: CPTII,S$GLB,, | Performed by: NURSE PRACTITIONER

## 2024-05-16 PROCEDURE — 3074F SYST BP LT 130 MM HG: CPT | Mod: CPTII,S$GLB,, | Performed by: NURSE PRACTITIONER

## 2024-05-16 PROCEDURE — 99214 OFFICE O/P EST MOD 30 MIN: CPT | Mod: S$GLB,,, | Performed by: NURSE PRACTITIONER

## 2024-05-16 RX ORDER — INSULIN PMP CART,AUT,G6/7,CNTR
1 EACH SUBCUTANEOUS
Qty: 45 EACH | Refills: 2 | Status: SHIPPED | OUTPATIENT
Start: 2024-05-16

## 2024-05-16 RX ORDER — INSULIN LISPRO 100 [IU]/ML
INJECTION, SOLUTION INTRAVENOUS; SUBCUTANEOUS
Qty: 80 ML | Refills: 2 | Status: SHIPPED | OUTPATIENT
Start: 2024-05-16

## 2024-05-16 RX ORDER — INSULIN PMP CART,AUT,G6/7,CNTR
1 EACH SUBCUTANEOUS
Qty: 45 EACH | Refills: 2 | Status: SHIPPED | OUTPATIENT
Start: 2024-05-16 | End: 2024-05-16

## 2024-05-16 RX ORDER — INSULIN LISPRO 100 [IU]/ML
INJECTION, SOLUTION INTRAVENOUS; SUBCUTANEOUS
Qty: 80 ML | Refills: 2 | Status: SHIPPED | OUTPATIENT
Start: 2024-05-16 | End: 2024-05-16

## 2024-05-16 NOTE — PROGRESS NOTES
CC: This 23 y.o. White male  is here for evaluation of  T1DM along with comorbidities indicated in the Visit Diagnosis section of this encounter.    HPI: Shadi Velazquez was diagnosed with T1DM in . He has been on an insulin pump since .     DM COMPLICATIONS: none    Initial visit 3/2024  New to Endocrine. Arrives with wife.   He changed from brand Humalog to insulin lispro per insurance formulary earlier this year and finds the latter not as effective and BGs are now higher.  He has been off insulin pump for the last few days d/t inability to get pump supplies. Needs higher doses of insulin with injections versus pump. He started with Lantus 10 units daily but increased to 18 units last night and BGs are still high today.   He was not using Control IQ. Tried  Basal IQ  but stopped d/t hypoglycemia.   Plan A1c today and will submit orders for insulin pump supplies for Tandem pump.   Patient also interested in Omnipod 5 since it's tubeless.   Prescription for Omnipod 5 sent to [facility].   See Diabetes Education for Omnipod 5 start.  --- may need to start Automated Mode later when pt can get a refill for Dexcom and change to G6.   Send Tandem insulin pump report or at least the insulin pump settings to office so that they can be optimized for Omnipod start.   Prescription for Baqsimi sent. Reviewed how to correct hypoglycemia.   Until insulin pump can be resumed, inject Lantus 10 units every 12 hours. Continue carb ratio of 8. Limit carbs.   Resume insulin pump 20 hours after last dose of Lantus.   Return to clinic in 6 weeks. Schedule diabetic eye exam.         Interval hx  Pt switched from Tslim without Control IQ to Omnipod 5 in March. He enjoys wearing tubeless pump.   Pt's controller  and he had to reset his pump settings from memory about a month ago. Reports BGs have been ok.     Denies entering in fake carbs or purposefully entering in different amount of carbs consumed. He waits 15  minutes after bolus before eating.   He performs manual bolus to bring down high BG sometimes.     LAST DIABETES EDUCATION:  3/19/24 for Omnipod 5 (previously on Tslim without AID), 4/2/24       HOSPITALIZED FOR DIABETES -  Yes -  DKA upon dx .    SIGNIFICANT DIABETES MED HISTORY:       PRESCRIBED DIABETES MEDICATIONS:   Humalog in Omnipod 5     Basal rate 1.3 u/hr   ICR 7   ISF 50, target glucose 130, correct above 160, active 4 hours       SELF MONITORING BLOOD GLUCOSE: Dexcom G7     CGM interpretation: glucoses overall at goal in fasting state and between meals but spike quite high into 200-300s after meals, especially dinner. No hypoglycemia.             HYPOGLYCEMIC EPISODES: none    Carries glucose gel.     CURRENT DIET: drinks water, SF drinks.     Usually skips breakfast. Eats lunch and dinner. Snacks before and after lunch.   Dinner tends to be heaviest meal. Does eat out fairly often.     CURRENT EXERCISE: none recent     SOCIAL: [employer]       /70   Pulse 110   Temp 98.7 °F (37.1 °C)   Wt 71.7 kg (158 lb)   BMI 24.75 kg/m²     ROS:   CONSTITUTIONAL: Appetite good, +  fatigue  : no urinary frequency   NEURO: No paresthesias.   OTHER: no polydipsia     PHYSICAL EXAM:  GENERAL: Well developed, well nourished. No acute distress.   PSYCH: AAOx3, appropriate mood and affect, conversant, well-groomed. Judgement and insight good.   NEURO: Cranial nerves grossly intact. Speech clear.   CHEST: Respirations even and unlabored.       Hemoglobin A1C   Date Value Ref Range Status   03/13/2024 6.8 (H) 4.0 - 5.6 % Final     Comment:     ADA Screening Guidelines:  5.7-6.4%  Consistent with prediabetes  >or=6.5%  Consistent with diabetes    High levels of fetal hemoglobin interfere with the HbA1C  assay. Heterozygous hemoglobin variants (HbS, HgC, etc)do  not significantly interfere with this assay.   However, presence of multiple variants may affect accuracy.         Lab Results   Component Value Date     "GLUTAMICACID 0.58 (H) 03/13/2024        Lab Results   Component Value Date    CHOL 123 11/18/2023     Lab Results   Component Value Date    HDL 42 11/18/2023     Lab Results   Component Value Date    LDLCALC 55.2 (L) 11/18/2023     Lab Results   Component Value Date    TRIG 129 11/18/2023     Lab Results   Component Value Date    CHOLHDL 34.1 11/18/2023           Component Value Date/Time     11/18/2023 1018    K 4.0 11/18/2023 1018     11/18/2023 1018    CO2 26 11/18/2023 1018    BUN 14 11/18/2023 1018    CREATININE 0.9 11/18/2023 1018     (H) 11/18/2023 1018    CALCIUM 9.5 11/18/2023 1018    ALKPHOS 88 11/18/2023 1018    AST 19 11/18/2023 1018    ALT 29 11/18/2023 1018    BILITOT 0.9 11/18/2023 1018    EGFRNORACEVR >60.0 11/18/2023 1018         No results found for: "LABMICR", "CREATRANDUR", "MICALBCREAT"    ASSESSMENT and PLAN:    A1C GOAL: < 7%     1. Type 1 diabetes mellitus without complication  Tighten up pump settings:     Reduce target glucose from 130 to 120.  Reduce correction threshhold from 160 to 140.   Avoid manual correction doses.   Increase carb ratio from 7 to 5.5 in the evening.   12 am - 7   5 pm - 5.5     Encouraged meals lower in simple carbs/saturated fat to help lower glucoses. Reviewed complications of uncontrolled diabetes.     Return to clinic in 3 months with labs prior.     insulin lispro (HUMALOG U-100 INSULIN) 100 unit/mL injection    insulin pump cart,automated,BT (OMNIPOD 5 G6 PODS, GEN 5,) Crtg    Hemoglobin A1C    Microalbumin/Creatinine Ratio, Urine              2. Presence of insulin pump            Pump backup plan  If the insulin pump is non functional and discontinued for anticipated more than 20 hours, please give daily injections of:  Long acting insulin LANTUS 30 units once daily  Short acting insulin HUMALOG for meals according to carb ratio 6 and sensitivity factor 50 with target of 130.      When the insulin pump is restarted, do not restart basal " rates until at least 22 hours after the last long acting insulin injection.   For any technical insulin pump issues, please contact the insulin pump company; the toll free number is printed on the label on the back of the insulin pump.    Orders Placed This Encounter   Procedures    Hemoglobin A1C     Standing Status:   Future     Standing Expiration Date:   7/15/2025    Microalbumin/Creatinine Ratio, Urine     Standing Status:   Future     Standing Expiration Date:   7/15/2025     Order Specific Question:   Specimen Source     Answer:   Urine        Follow up in about 3 months (around 8/16/2024).

## 2024-05-16 NOTE — PATIENT INSTRUCTIONS
Tighten up pump settings:     Reduce target glucose from 130 to 120.  Reduce correction threshhold from 160 to 140.     Increase carb ratio from 7 to 5.5 in the evening.   12 am - 7   5 pm - 5.5     Encouraged meals lower in simple carbs/saturated fat to help lower glucoses. Reviewed complications of uncontrolled diabetes.     Return to clinic in 3 months with labs prior.     Pump backup plan  If the insulin pump is non functional and discontinued for anticipated more than 20 hours, please give daily injections of:  Long acting insulin LANTUS 30 units once daily  Short acting insulin HUMALOG for meals according to carb ratio 6 and sensitivity factor 50 with target of 130.      When the insulin pump is restarted, do not restart basal rates until at least 22 hours after the last long acting insulin injection.   For any technical insulin pump issues, please contact the insulin pump company; the toll free number is printed on the label on the back of the insulin pump.

## 2024-05-16 NOTE — PROGRESS NOTES
Patient due for the following    Diabetes Urine Screening     COVID-19 Vaccine (1)    Foot Exam     Eye Exam     Pneumococcal Vaccines (Age 0-64) (2 of 2 - PCV)    HPV Vaccines (3 - Male 3-dose series)    Low Dose Statin       Commercial gap report chart review completed for diabetic screenings.  Completed hga1c 3/3024  DM eye exam scheduled  Scheduled with endocrine    Immunizations: reviewed and updated  Care Everywhere: triggered  Care Teams: up to date  Outreach: completed

## 2024-08-20 DIAGNOSIS — E10.9 TYPE 1 DIABETES MELLITUS WITHOUT COMPLICATION: ICD-10-CM

## 2024-08-20 RX ORDER — BLOOD-GLUCOSE SENSOR
EACH MISCELLANEOUS
Qty: 3 EACH | Refills: 11 | Status: SHIPPED | OUTPATIENT
Start: 2024-08-20

## 2024-08-20 RX ORDER — INSULIN LISPRO 100 [IU]/ML
INJECTION, SOLUTION INTRAVENOUS; SUBCUTANEOUS
Qty: 80 ML | Refills: 0 | Status: SHIPPED | OUTPATIENT
Start: 2024-08-20

## 2024-08-20 RX ORDER — INSULIN PMP CART,AUT,G6/7,CNTR
1 EACH SUBCUTANEOUS
Qty: 45 EACH | Refills: 0 | Status: SHIPPED | OUTPATIENT
Start: 2024-08-20

## 2024-08-20 RX ORDER — BLOOD-GLUCOSE TRANSMITTER
EACH MISCELLANEOUS
Qty: 1 EACH | Refills: 3 | Status: SHIPPED | OUTPATIENT
Start: 2024-08-20

## 2024-08-20 NOTE — TELEPHONE ENCOUNTER
----- Message from Noemí Foster sent at 8/20/2024 12:26 PM CDT -----  Regarding: Refill request  .Type: RX Refill Request    Who Called:self     Have you contacted your pharmacy:no     Refill or New Rx: Refill    RX Name and Strength:insulin lispro (HUMALOG U-100 INSULIN) 100 unit/mL injection, OMNI pods insulin and Dexcom G6 senor and Dexcom G6 transmitter     Preferred Pharmacy with phone number:.  [facility]  [address] [address]   Telephone [phone]  Fax [phone]         Local or Mail Order:local     Ordering Provider:KARINA Clarke     Would the patient rather a call back or a response via [facility]? Call     Best Call Back Number:.[phone]      Additional Information: states he is going out of town; asking if he can get refills early. Will be traveling to [place] on Monday 08/26/2024

## 2024-08-20 NOTE — TELEPHONE ENCOUNTER
Spoke to patient, advised that refills were sent but that pt was scheduled for an appointment today that he did not show up for and that an appointment will be needed for additional refills. Pt states he will call back to reschedule

## 2024-12-21 DIAGNOSIS — E10.9 TYPE 1 DIABETES MELLITUS WITHOUT COMPLICATION: ICD-10-CM

## 2024-12-21 RX ORDER — INSULIN LISPRO 100 [IU]/ML
INJECTION, SOLUTION INTRAVENOUS; SUBCUTANEOUS
Qty: 80 ML | Refills: 0 | Status: CANCELLED | OUTPATIENT
Start: 2024-12-21

## 2024-12-23 DIAGNOSIS — E10.9 TYPE 1 DIABETES MELLITUS WITHOUT COMPLICATION: ICD-10-CM

## 2024-12-23 RX ORDER — INSULIN LISPRO 100 [IU]/ML
INJECTION, SOLUTION INTRAVENOUS; SUBCUTANEOUS
Qty: 80 ML | Refills: 0 | Status: SHIPPED | OUTPATIENT
Start: 2024-12-23

## 2024-12-30 ENCOUNTER — OCCUPATIONAL HEALTH (OUTPATIENT)
Dept: URGENT CARE | Facility: CLINIC | Age: 24
End: 2024-12-30

## 2024-12-30 VITALS — WEIGHT: 165 LBS | HEIGHT: 67 IN | BODY MASS INDEX: 25.9 KG/M2

## 2024-12-30 DIAGNOSIS — Z76.89 RETURN TO WORK EVALUATION: Primary | ICD-10-CM

## 2024-12-30 PROCEDURE — 99499 UNLISTED E&M SERVICE: CPT | Mod: S$GLB,,, | Performed by: SURGERY

## 2025-01-02 RX ORDER — INSULIN PMP CART,AUT,G6/7,CNTR
EACH SUBCUTANEOUS
Qty: 15 EACH | Refills: 0 | Status: SHIPPED | OUTPATIENT
Start: 2025-01-02 | End: 2025-01-03

## 2025-01-03 RX ORDER — INSULIN PMP CART,AUT,G6/7,CNTR
EACH SUBCUTANEOUS
Qty: 25 EACH | Refills: 0 | Status: SHIPPED | OUTPATIENT
Start: 2025-01-03

## 2025-01-21 ENCOUNTER — TELEPHONE (OUTPATIENT)
Dept: ENDOCRINOLOGY | Facility: CLINIC | Age: 25
End: 2025-01-21
Payer: COMMERCIAL

## 2025-01-22 ENCOUNTER — OFFICE VISIT (OUTPATIENT)
Dept: ENDOCRINOLOGY | Facility: CLINIC | Age: 25
End: 2025-01-22
Payer: COMMERCIAL

## 2025-01-22 DIAGNOSIS — E10.9 TYPE 1 DIABETES MELLITUS WITHOUT COMPLICATION: Primary | ICD-10-CM

## 2025-01-22 RX ORDER — INSULIN LISPRO 100 [IU]/ML
INJECTION, SOLUTION INTRAVENOUS; SUBCUTANEOUS
Qty: 80 ML | Refills: 1 | Status: SHIPPED | OUTPATIENT
Start: 2025-01-22

## 2025-01-22 NOTE — PROGRESS NOTES
CC: This 24 y.o. White male  is here for evaluation of  T1DM along with comorbidities indicated in the Visit Diagnosis section of this encounter.    HPI: Shadi Velazquez was diagnosed with T1DM in . He has been on an insulin pump since .     DM COMPLICATIONS: none    Initial visit 3/2024  New to Endocrine. Arrives with wife.   He changed from brand Humalog to insulin lispro per insurance formulary earlier this year and finds the latter not as effective and BGs are now higher.  He has been off insulin pump for the last few days d/t inability to get pump supplies. Needs higher doses of insulin with injections versus pump. He started with Lantus 10 units daily but increased to 18 units last night and BGs are still high today.   He was not using Control IQ. Tried  Basal IQ  but stopped d/t hypoglycemia.   Plan A1c today and will submit orders for insulin pump supplies for Tandem pump.   Patient also interested in Omnipod 5 since it's tubeless.   Prescription for Omnipod 5 sent to [facility].   See Diabetes Education for Omnipod 5 start.  --- may need to start Automated Mode later when pt can get a refill for Dexcom and change to G6.   Send Tandem insulin pump report or at least the insulin pump settings to office so that they can be optimized for Omnipod start.   Prescription for Baqsimi sent. Reviewed how to correct hypoglycemia.   Until insulin pump can be resumed, inject Lantus 10 units every 12 hours. Continue carb ratio of 8. Limit carbs.   Resume insulin pump 20 hours after last dose of Lantus.   Return to clinic in 6 weeks. Schedule diabetic eye exam.         Prior visit 2024  Pt switched from Tslim without Control IQ to Omnipod 5 in March. He enjoys wearing tubeless pump.   Pt's controller  and he had to reset his pump settings from memory about a month ago. Reports BGs have been ok.   Denies entering in fake carbs or purposefully entering in different amount of carbs consumed. He  waits 15 minutes after bolus before eating.   He performs manual bolus to bring down high BG sometimes.   Plan Tighten up pump settings:   Reduce target glucose from 130 to 120.  Reduce correction threshhold from 160 to 140.   Avoid manual correction doses.   Increase carb ratio from 7 to 5.5 in the evening.   12 am - 7   5 pm - 5.5   Encouraged meals lower in simple carbs/saturated fat to help lower glucoses. Reviewed complications of uncontrolled diabetes.   Return to clinic in 3 months with labs prior.       Interval hx virtual   Finds it difficult to time boluses 15 minutes ac when he works. He is a .     Reports getting steroid injection in mid Dec d/t lung infection and glucoses have been high.   Believes his current pump settings are adequate.         LAST DIABETES EDUCATION:  3/19/24 for Omnipod 5 (previously on Tslim without AID), 4/2/24       HOSPITALIZED FOR DIABETES -  Yes -  DKA upon dx .    SIGNIFICANT DIABETES MED HISTORY:       PRESCRIBED DIABETES MEDICATIONS:   Humalog in Omnipod 5     Basal rate 1.3 u/hr   ICR 7   ISF 50, target glucose 130, correct above 160, active 4 hours       SELF MONITORING BLOOD GLUCOSE: Dexcom G7     CGM interpretation: glucoses fairly controlled. Prominent post-meal spikes into the 300s. High average carb intake 192 g/day. Missed a few meal boluses. A couple of lows noted - perhaps d/t strong ISF. TIR 53%       HYPOGLYCEMIC EPISODES: infrequent     Carries glucose gel.     CURRENT DIET: drinks water, SF drinks.     Usually skips breakfast. Eats lunch and dinner. Snacks before and after lunch.   Does eat out fairly often.     CURRENT EXERCISE: none recent     SOCIAL: [employer]       There were no vitals taken for this visit.    ROS:   CONSTITUTIONAL: Appetite good, +  fatigue      PHYSICAL EXAM:  GENERAL: Well developed, well nourished. No acute distress.   PSYCH: AAOx3, appropriate mood and affect, conversant, well-groomed. Judgement and insight good.  "  NEURO: Cranial nerves grossly intact. Speech clear.   CHEST: Respirations even and unlabored.       Hemoglobin A1C   Date Value Ref Range Status   03/13/2024 6.8 (H) 4.0 - 5.6 % Final     Comment:     ADA Screening Guidelines:  5.7-6.4%  Consistent with prediabetes  >or=6.5%  Consistent with diabetes    High levels of fetal hemoglobin interfere with the HbA1C  assay. Heterozygous hemoglobin variants (HbS, HgC, etc)do  not significantly interfere with this assay.   However, presence of multiple variants may affect accuracy.         Lab Results   Component Value Date    GLUTAMICACID 0.58 (H) 03/13/2024        Lab Results   Component Value Date    CHOL 123 11/18/2023     Lab Results   Component Value Date    HDL 42 11/18/2023     Lab Results   Component Value Date    LDLCALC 55.2 (L) 11/18/2023     Lab Results   Component Value Date    TRIG 129 11/18/2023     Lab Results   Component Value Date    CHOLHDL 34.1 11/18/2023           Component Value Date/Time     11/18/2023 1018    K 4.0 11/18/2023 1018     11/18/2023 1018    CO2 26 11/18/2023 1018    BUN 14 11/18/2023 1018    CREATININE 0.9 11/18/2023 1018     (H) 11/18/2023 1018    CALCIUM 9.5 11/18/2023 1018    ALKPHOS 88 11/18/2023 1018    AST 19 11/18/2023 1018    ALT 29 11/18/2023 1018    BILITOT 0.9 11/18/2023 1018    EGFRNORACEVR >60.0 11/18/2023 1018         No results found for: "LABMICR", "CREATRANDUR", "MICALBCREAT"    ASSESSMENT and PLAN:    A1C GOAL: < 7%      1. Type 1 diabetes mellitus without complication  Bolus carbs immediately after meal instead of before meal if unsure meal can be consumed completely at work.   Continue current pump settings.   Rtc in 3 mo with labs prior, virtual.   A1c at earliest convenience.     Hemoglobin A1C    Microalbumin/Creatinine Ratio, Urine    insulin lispro (HUMALOG U-100 INSULIN) 100 unit/mL injection            Pump backup plan  If the insulin pump is non functional and discontinued for anticipated " more than 20 hours, please give daily injections of:  Long acting insulin LANTUS 30 units once daily  Short acting insulin HUMALOG for meals according to carb ratio 6 and sensitivity factor 50 with target of 130.      When the insulin pump is restarted, do not restart basal rates until at least 22 hours after the last long acting insulin injection.   For any technical insulin pump issues, please contact the insulin pump company; the toll free number is printed on the label on the back of the insulin pump.    No orders of the defined types were placed in this encounter.       No follow-ups on file.

## 2025-01-24 ENCOUNTER — TELEPHONE (OUTPATIENT)
Dept: ENDOCRINOLOGY | Facility: CLINIC | Age: 25
End: 2025-01-24
Payer: COMMERCIAL

## 2025-01-24 NOTE — TELEPHONE ENCOUNTER
Message received. Patient follow up appointment schedule with lab schedule prior to visit. Omnipod report dated from 01/09/2025- 01/22/2025 uploaded to pt chart.

## 2025-02-24 ENCOUNTER — PATIENT MESSAGE (OUTPATIENT)
Dept: ENDOCRINOLOGY | Facility: CLINIC | Age: 25
End: 2025-02-24
Payer: COMMERCIAL

## 2025-02-24 DIAGNOSIS — E10.9 TYPE 1 DIABETES MELLITUS WITHOUT COMPLICATION: ICD-10-CM

## 2025-02-24 RX ORDER — BLOOD-GLUCOSE TRANSMITTER
EACH MISCELLANEOUS
Qty: 1 EACH | Refills: 3 | Status: SHIPPED | OUTPATIENT
Start: 2025-02-24

## 2025-02-24 RX ORDER — BLOOD-GLUCOSE SENSOR
EACH MISCELLANEOUS
Qty: 12 EACH | Refills: 3 | Status: SHIPPED | OUTPATIENT
Start: 2025-02-24

## 2025-03-13 ENCOUNTER — PATIENT MESSAGE (OUTPATIENT)
Dept: ENDOCRINOLOGY | Facility: CLINIC | Age: 25
End: 2025-03-13
Payer: COMMERCIAL

## 2025-03-13 DIAGNOSIS — E10.9 TYPE 1 DIABETES MELLITUS WITHOUT COMPLICATION: Primary | ICD-10-CM

## 2025-03-14 RX ORDER — INSULIN PMP CART,AUT,G6/7,CNTR
EACH SUBCUTANEOUS
Qty: 45 EACH | Refills: 2 | Status: SHIPPED | OUTPATIENT
Start: 2025-03-14

## 2025-04-09 ENCOUNTER — PATIENT MESSAGE (OUTPATIENT)
Dept: ENDOCRINOLOGY | Facility: CLINIC | Age: 25
End: 2025-04-09
Payer: COMMERCIAL

## 2025-04-09 DIAGNOSIS — E10.9 TYPE 1 DIABETES MELLITUS WITHOUT COMPLICATION: ICD-10-CM

## 2025-04-09 RX ORDER — INSULIN PMP CART,AUT,G6/7,CNTR
EACH SUBCUTANEOUS
Qty: 45 EACH | Refills: 2 | Status: SHIPPED | OUTPATIENT
Start: 2025-04-09

## 2025-04-25 ENCOUNTER — OFFICE VISIT (OUTPATIENT)
Dept: ENDOCRINOLOGY | Facility: CLINIC | Age: 25
End: 2025-04-25
Payer: COMMERCIAL

## 2025-04-25 DIAGNOSIS — E10.9 TYPE 1 DIABETES MELLITUS WITHOUT COMPLICATION: Primary | ICD-10-CM

## 2025-04-25 RX ORDER — INSULIN LISPRO 100 [IU]/ML
INJECTION, SOLUTION INTRAVENOUS; SUBCUTANEOUS
Qty: 80 ML | Refills: 2 | Status: SHIPPED | OUTPATIENT
Start: 2025-04-25

## 2025-04-25 NOTE — PROGRESS NOTES
CC: This 24 y.o. White male  is here for evaluation of  T1DM along with comorbidities indicated in the Visit Diagnosis section of this encounter.    HPI: Shadi Velazquez was diagnosed with T1DM in 2012. He has been on an insulin pump since 2013.   Pt switched from Tslim without Control IQ to Omnipod 5 in March 2024.   DM COMPLICATIONS: none          Prior visit 1/22/25 virtual   Finds it difficult to time boluses 15 minutes ac when he works. He is a .   Reports getting steroid injection in mid Dec d/t lung infection and glucoses have been high.   Believes his current pump settings are adequate.   Plan Bolus carbs immediately after meal instead of before meal if unsure meal can be consumed completely at work.   Continue current pump settings.   Rtc in 3 mo with labs prior, virtual.   A1c at earliest convenience.         Interval hx  Still no recent A1c available.  Reports BGs have been decent.      He has been eating more because he's exercising.   Log food without insulin delivery.     He skips boluses ac after he works out to avoid lows - reports his BGs are best controlled on exercise days. C/o BGs are higher on days he doesn't work out.           LAST DIABETES EDUCATION:  3/19/24 for Omnipod 5 (previously on Tslim without AID), 4/2/24       HOSPITALIZED FOR DIABETES -  Yes -  DKA upon dx .    SIGNIFICANT DIABETES MED HISTORY:   Humalog brand more effective than insulin lispro    PRESCRIBED DIABETES MEDICATIONS:   Humalog in Omnipod 5     Basal rate 1.3 u/hr   ICR 7   ISF 50, target glucose 120, correct above 140, active 4 hours       SELF MONITORING BLOOD GLUCOSE: Dexcom G7     CGM interpretation: glucoses fairly controlled.  Pt attributes high glucoses in the past week to vacation.  However glucoses were similarly high the week prior as well.  Glucoses spike significantly and  quickly after meals and remain high for hours, suggesting carb ratio and correction factor are both week.  Rapid drops  in glucoses likely due to insulin stacking from manual correction doses.      HYPOGLYCEMIC EPISODES: infrequent     Carries glucose gel.     CURRENT DIET: drinks water, SF drinks.     Usually skips breakfast. Eats lunch and dinner. Snacks before and after lunch.   Does eat out fairly often.     CURRENT EXERCISE: none recent     SOCIAL: [employer]       There were no vitals taken for this visit.    ROS:   CONSTITUTIONAL: Appetite good, +  fatigue      PHYSICAL EXAM:  GENERAL: Well developed, well nourished. No acute distress.   PSYCH: AAOx3, appropriate mood and affect, conversant, well-groomed. Judgement and insight good.   NEURO: Cranial nerves grossly intact. Speech clear.   CHEST: Respirations even and unlabored.       Hemoglobin A1C   Date Value Ref Range Status   03/13/2024 6.8 (H) 4.0 - 5.6 % Final     Comment:     ADA Screening Guidelines:  5.7-6.4%  Consistent with prediabetes  >or=6.5%  Consistent with diabetes    High levels of fetal hemoglobin interfere with the HbA1C  assay. Heterozygous hemoglobin variants (HbS, HgC, etc)do  not significantly interfere with this assay.   However, presence of multiple variants may affect accuracy.         Lab Results   Component Value Date    GLUTAMICACID 0.58 (H) 03/13/2024        Lab Results   Component Value Date    CHOL 123 11/18/2023     Lab Results   Component Value Date    HDL 42 11/18/2023     Lab Results   Component Value Date    LDLCALC 55.2 (L) 11/18/2023     Lab Results   Component Value Date    TRIG 129 11/18/2023     Lab Results   Component Value Date    CHOLHDL 34.1 11/18/2023           Component Value Date/Time     11/18/2023 1018    K 4.0 11/18/2023 1018     11/18/2023 1018    CO2 26 11/18/2023 1018    BUN 14 11/18/2023 1018    CREATININE 0.9 11/18/2023 1018     (H) 11/18/2023 1018    CALCIUM 9.5 11/18/2023 1018    ALKPHOS 88 11/18/2023 1018    AST 19 11/18/2023 1018    ALT 29 11/18/2023 1018    BILITOT 0.9 11/18/2023 1018     "EGFRNORACEVR >60.0 11/18/2023 1018         No results found for: "LABMICR", "CREATRANDUR", "MICALBCREAT"    ASSESSMENT and PLAN:    A1C GOAL: < 7%      1. Type 1 diabetes mellitus without complication  Labs next week.  Increase carb ratio from 7 to 6.  Increase correction factor from 50 to correction factor 40.  Improve diet.  Avoid high carb, highly processed foods.  Recommend diet high in fiber and protein.  Return to clinic in 3 months with A1c prior.      TSH    Hemoglobin A1C              Pump backup plan  If the insulin pump is non functional and discontinued for anticipated more than 20 hours, please give daily injections of:  Long acting insulin LANTUS 30 units once daily  Short acting insulin HUMALOG for meals according to carb ratio 6 and sensitivity factor 50 with target of 130.      When the insulin pump is restarted, do not restart basal rates until at least 22 hours after the last long acting insulin injection.   For any technical insulin pump issues, please contact the insulin pump company; the toll free number is printed on the label on the back of the insulin pump.    Orders Placed This Encounter   Procedures    TSH     Standing Status:   Future     Expected Date:   4/25/2025     Expiration Date:   6/24/2026     Send normal result to authorizing provider's In Basket if patient is active on MyChart::   Yes    Hemoglobin A1C     Standing Status:   Future     Expected Date:   4/25/2025     Expiration Date:   6/24/2026     Send normal result to authorizing provider's In Basket if patient is active on MyChart::   Yes        Follow up in about 3 months (around 7/25/2025).       The patient location is: [place]   The chief complaint leading to consultation is: type 1 dm     Visit type: audiovisual     Face to Face time with patient: 38   minutes of total time spent on the encounter, which includes face to face time and non-face to face time preparing to see the patient (eg, review of tests), Obtaining " and/or reviewing separately obtained history, Documenting clinical information in the electronic or other health record, Independently interpreting results (not separately reported) and communicating results to the patient/family/caregiver, or Care coordination (not separately reported).         Each patient to whom he or she provides medical services by telemedicine is:  (1) informed of the relationship between the physician and patient and the respective role of any other health care provider with respect to management of the patient; and (2) notified that he or she may decline to receive medical services by telemedicine and may withdraw from such care at any time.    Notes:

## 2025-04-28 ENCOUNTER — PATIENT MESSAGE (OUTPATIENT)
Dept: ENDOCRINOLOGY | Facility: CLINIC | Age: 25
End: 2025-04-28
Payer: COMMERCIAL

## 2025-04-28 DIAGNOSIS — E10.9 TYPE 1 DIABETES MELLITUS WITHOUT COMPLICATION: ICD-10-CM

## 2025-05-09 RX ORDER — INSULIN PMP CART,AUT,G6/7,CNTR
EACH SUBCUTANEOUS
Qty: 45 EACH | Refills: 2 | Status: SHIPPED | OUTPATIENT
Start: 2025-05-09

## 2025-06-02 ENCOUNTER — PATIENT OUTREACH (OUTPATIENT)
Dept: ADMINISTRATIVE | Facility: HOSPITAL | Age: 25
End: 2025-06-02
Payer: COMMERCIAL

## 2025-06-12 ENCOUNTER — PATIENT MESSAGE (OUTPATIENT)
Dept: ENDOCRINOLOGY | Facility: CLINIC | Age: 25
End: 2025-06-12
Payer: COMMERCIAL

## 2025-06-23 RX ORDER — GLUCAGON 3 MG/1
POWDER NASAL
Qty: 2 EACH | Refills: 0 | Status: SHIPPED | OUTPATIENT
Start: 2025-06-23

## 2025-07-17 ENCOUNTER — LAB VISIT (OUTPATIENT)
Dept: LAB | Facility: HOSPITAL | Age: 25
End: 2025-07-17
Payer: COMMERCIAL

## 2025-07-17 DIAGNOSIS — E10.9 TYPE 1 DIABETES MELLITUS WITHOUT COMPLICATION: ICD-10-CM

## 2025-07-17 LAB
ALBUMIN/CREAT UR: 4.5 UG/MG
CREAT UR-MCNC: 201 MG/DL (ref 23–375)
EAG (OHS): 134 MG/DL (ref 68–131)
HBA1C MFR BLD: 6.3 % (ref 4–5.6)
MICROALBUMIN UR-MCNC: 9 UG/ML (ref ?–5000)
TSH SERPL-ACNC: 1.16 UIU/ML (ref 0.4–4)

## 2025-07-17 PROCEDURE — 82570 ASSAY OF URINE CREATININE: CPT

## 2025-07-17 PROCEDURE — 83036 HEMOGLOBIN GLYCOSYLATED A1C: CPT

## 2025-07-17 PROCEDURE — 36415 COLL VENOUS BLD VENIPUNCTURE: CPT | Mod: PO

## 2025-07-17 PROCEDURE — 84443 ASSAY THYROID STIM HORMONE: CPT

## 2025-07-31 DIAGNOSIS — E10.9 TYPE 1 DIABETES MELLITUS WITHOUT COMPLICATION: ICD-10-CM

## 2025-07-31 RX ORDER — INSULIN LISPRO 100 [IU]/ML
INJECTION, SOLUTION INTRAVENOUS; SUBCUTANEOUS
Qty: 80 ML | Refills: 2 | Status: SHIPPED | OUTPATIENT
Start: 2025-07-31

## 2025-08-07 ENCOUNTER — TELEPHONE (OUTPATIENT)
Dept: ENDOCRINOLOGY | Facility: CLINIC | Age: 25
End: 2025-08-07
Payer: COMMERCIAL

## 2025-08-08 ENCOUNTER — OFFICE VISIT (OUTPATIENT)
Dept: ENDOCRINOLOGY | Facility: CLINIC | Age: 25
End: 2025-08-08
Payer: COMMERCIAL

## 2025-08-08 DIAGNOSIS — E10.9 TYPE 1 DIABETES MELLITUS WITHOUT COMPLICATION: Primary | ICD-10-CM

## 2025-08-08 NOTE — PROGRESS NOTES
CC: This 25 y.o. White male  is here for evaluation of  T1DM along with comorbidities indicated in the Visit Diagnosis section of this encounter.    HPI: Shadi Velazquez was diagnosed with T1DM in 2012. He has been on an insulin pump since 2013.   Pt switched from Tslim without Control IQ to Omnipod 5 in March 2024.   DM COMPLICATIONS: none          Prior visit 04/25/2025  A1c is down from 6.8 to 6.3%.   Plan Labs next week.  Increase carb ratio from 7 to 6.  Increase correction factor from 50 to correction factor 40.  Improve diet.  Avoid high carb, highly processed foods.  Recommend diet high in fiber and protein.  Return to clinic in 3 months with A1c prior.          Interval hx virtual   A1c is down from 6.8% to 6.3%.  Glucoses overall have been lower though he does note that many times his glucoses drop quickly after meal boluses.  Reports he had fluctuating glucoses for about 2 weeks which he suspects was from a bad batch of insulin.               LAST DIABETES EDUCATION:  3/19/24 for Omnipod 5 (previously on Tslim without AID), 4/2/24       HOSPITALIZED FOR DIABETES -  Yes -  DKA upon dx .    SIGNIFICANT DIABETES MED HISTORY:   Humalog brand more effective than insulin lispro    PRESCRIBED DIABETES MEDICATIONS:   Humalog in Omnipod 5     Basal rate 1.3 u/hr   ICR 6   ISF 40, target glucose 120, correct above 140, active 4 hours       SELF MONITORING BLOOD GLUCOSE: Dexcom G7     CGM interpretation:  Time in range approved from 59% to 72%.  However patient does often have rapid drops in glucoses.  Highest glucoses remain after meals.  Meals continued to be fairly high in carbohydrates.    HYPOGLYCEMIC EPISODES: infrequent     Carries glucose gel.     CURRENT DIET: drinks water, SF drinks.     Usually skips breakfast. Eats lunch and dinner. Snacks before and after lunch.   Does eat out fairly often.     CURRENT EXERCISE: none recent     SOCIAL: [employer]       There were no vitals taken for this  visit.    ROS:   CONSTITUTIONAL: Appetite good, +  fatigue      PHYSICAL EXAM:  GENERAL: Well developed, well nourished. No acute distress.   PSYCH: AAOx3, appropriate mood and affect, conversant, well-groomed. Judgement and insight good.   NEURO: Cranial nerves grossly intact. Speech clear.   CHEST: Respirations even and unlabored.       Hemoglobin A1C   Date Value Ref Range Status   03/13/2024 6.8 (H) 4.0 - 5.6 % Final     Comment:     ADA Screening Guidelines:  5.7-6.4%  Consistent with prediabetes  >or=6.5%  Consistent with diabetes    High levels of fetal hemoglobin interfere with the HbA1C  assay. Heterozygous hemoglobin variants (HbS, HgC, etc)do  not significantly interfere with this assay.   However, presence of multiple variants may affect accuracy.       Hemoglobin A1c   Date Value Ref Range Status   07/17/2025 6.3 (H) 4.0 - 5.6 % Final     Comment:     ADA Screening Guidelines:  5.7-6.4%  Consistent with prediabetes  >=6.5%  Consistent with diabetes    High levels of fetal hemoglobin interfere with the HbA1C  assay. Heterozygous hemoglobin variants (HbS, HgC, etc)do  not significantly interfere with this assay.   However, presence of multiple variants may affect accuracy.       Lab Results   Component Value Date    GLUTAMICACID 0.58 (H) 03/13/2024        Lab Results   Component Value Date    CHOL 123 11/18/2023     Lab Results   Component Value Date    HDL 42 11/18/2023     Lab Results   Component Value Date    LDLCALC 55.2 (L) 11/18/2023     Lab Results   Component Value Date    TRIG 129 11/18/2023     Lab Results   Component Value Date    CHOLHDL 34.1 11/18/2023           Component Value Date/Time     11/18/2023 1018    K 4.0 11/18/2023 1018     11/18/2023 1018    CO2 26 11/18/2023 1018    BUN 14 11/18/2023 1018    CREATININE 0.9 11/18/2023 1018     (H) 11/18/2023 1018    CALCIUM 9.5 11/18/2023 1018    ALKPHOS 88 11/18/2023 1018    AST 19 11/18/2023 1018    ALT 29 11/18/2023 1018     BILITOT 0.9 11/18/2023 1018    EGFRNORACEVR >60.0 11/18/2023 1018         Lab Results   Component Value Date    LABMICR 9.0 07/17/2025    CREATRANDUR 201.0 07/17/2025    MICALBCREAT 4.5 07/17/2025       ASSESSMENT and PLAN:    A1C GOAL: < 7%      1. Type 1 diabetes mellitus without complication  Decrease carb ratio fro 6 to 7.5.     Increase correction factor from 40 to 32.   Reduced target glucose to 110 and correction threshold to 120.   Return to clinic in 3 months with labs prior. Virtual       Hemoglobin A1C              Pump backup plan  If the insulin pump is non functional and discontinued for anticipated more than 20 hours, please give daily injections of:  Long acting insulin LANTUS 30 units once daily  Short acting insulin HUMALOG for meals according to carb ratio 6 and sensitivity factor 32 with target of 120.     When the insulin pump is restarted, do not restart basal rates until at least 22 hours after the last long acting insulin injection.   For any technical insulin pump issues, please contact the insulin pump company; the toll free number is printed on the label on the back of the insulin pump.    Orders Placed This Encounter   Procedures    Hemoglobin A1C     Standing Status:   Future     Expected Date:   8/8/2025     Expiration Date:   10/7/2026     Send normal result to authorizing provider's In Basket if patient is active on MyChart::   Yes        Follow up in about 3 months (around 11/8/2025) for Virtual Visit.       The patient location is: [place]   The chief complaint leading to consultation is: type 1 dm     Visit type: audiovisual     Face to Face time with patient: 25   minutes of total time spent on the encounter, which includes face to face time and non-face to face time preparing to see the patient (eg, review of tests), Obtaining and/or reviewing separately obtained history, Documenting clinical information in the electronic or other health record, Independently interpreting  results (not separately reported) and communicating results to the patient/family/caregiver, or Care coordination (not separately reported).         Each patient to whom he or she provides medical services by telemedicine is:  (1) informed of the relationship between the physician and patient and the respective role of any other health care provider with respect to management of the patient; and (2) notified that he or she may decline to receive medical services by telemedicine and may withdraw from such care at any time.    Notes:

## 2025-08-08 NOTE — PATIENT INSTRUCTIONS
Decrease carb ratio fro 6 to 7.5.     Increase correction factor from 40 to 32.   Reduced target glucose to 110 and correction threshold to 120.   Return to clinic in 3 months with labs prior. Virtual         Pump backup plan  If the insulin pump is non functional and discontinued for anticipated more than 20 hours, please give daily injections of:  Long acting insulin LANTUS 30 units once daily  Short acting insulin HUMALOG for meals according to carb ratio 7 and sensitivity factor 32 with target of 120.     When the insulin pump is restarted, do not restart basal rates until at least 22 hours after the last long acting insulin injection.   For any technical insulin pump issues, please contact the insulin pump company; the toll free number is printed on the label on the back of the insulin pump.